# Patient Record
Sex: MALE | Race: WHITE | NOT HISPANIC OR LATINO | Employment: OTHER | URBAN - METROPOLITAN AREA
[De-identification: names, ages, dates, MRNs, and addresses within clinical notes are randomized per-mention and may not be internally consistent; named-entity substitution may affect disease eponyms.]

---

## 2017-02-14 ENCOUNTER — HISTORICAL (OUTPATIENT)
Dept: ADMINISTRATIVE | Facility: HOSPITAL | Age: 37
End: 2017-02-14

## 2017-02-27 ENCOUNTER — HISTORICAL (OUTPATIENT)
Dept: ORTHOPEDICS | Facility: CLINIC | Age: 37
End: 2017-02-27

## 2020-04-07 ENCOUNTER — TELEPHONE (OUTPATIENT)
Dept: FAMILY MEDICINE | Facility: CLINIC | Age: 40
End: 2020-04-07

## 2020-04-07 NOTE — TELEPHONE ENCOUNTER
----- Message from Tevin Palacios sent at 4/7/2020 10:03 AM CDT -----  Contact: self  Type:  Patient Returning Call    Who Called:pt  Who Left Message for Patient:n/a  Does the patient know what this is regarding?:no  Would the patient rather a call back or a response via [facility]? Call back  Best Call Back Number:[phone]  Additional Information: none    Thanks,  Tevin Palacios

## 2020-04-07 NOTE — TELEPHONE ENCOUNTER
I called and spoke with the pt letting him know that I had to cancel his apt on 4/13 because of the COVID-19. I rescheduled him to May 4th

## 2020-04-13 ENCOUNTER — OFFICE VISIT (OUTPATIENT)
Dept: UROLOGY | Facility: CLINIC | Age: 40
End: 2020-04-13
Payer: MEDICAID

## 2020-04-13 VITALS
HEART RATE: 80 BPM | WEIGHT: 240 LBS | HEIGHT: 71 IN | SYSTOLIC BLOOD PRESSURE: 142 MMHG | BODY MASS INDEX: 33.6 KG/M2 | DIASTOLIC BLOOD PRESSURE: 87 MMHG | RESPIRATION RATE: 18 BRPM

## 2020-04-13 DIAGNOSIS — R10.32 LEFT INGUINAL PAIN: ICD-10-CM

## 2020-04-13 DIAGNOSIS — R10.9 LEFT FLANK PAIN: Primary | ICD-10-CM

## 2020-04-13 LAB
BILIRUB UR QL STRIP: NEGATIVE
GLUCOSE UR QL STRIP: NEGATIVE
KETONES UR QL STRIP: NEGATIVE
LEUKOCYTE ESTERASE UR QL STRIP: NEGATIVE
PH, POC UA: 6
POC AMORP, URINE: ABNORMAL
POC BACTI, URINE: ABNORMAL
POC BLOOD, URINE: NEGATIVE
POC CASTS, URINE: ABNORMAL
POC CRYST, URINE: ABNORMAL
POC EPITH, URINE: ABNORMAL
POC HCG, URINE: ABNORMAL
POC HYALIN, URINE: 0 LPF
POC MUCUS, URINE: ABNORMAL
POC NITRATES, URINE: NEGATIVE
POC OTHER, URINE: ABNORMAL
POC RBC, URINE: 0 HPF
POC WBC, URINE: 0 HPF
PROT UR QL STRIP: NEGATIVE
SP GR UR STRIP: 1.02 (ref 1–1.03)
UROBILINOGEN UR STRIP-ACNC: 0.2 (ref 0.3–2.2)

## 2020-04-13 PROCEDURE — 99213 OFFICE O/P EST LOW 20 MIN: CPT | Mod: S$GLB,,, | Performed by: SPECIALIST

## 2020-04-13 PROCEDURE — 99213 PR OFFICE/OUTPT VISIT, EST, LEVL III, 20-29 MIN: ICD-10-PCS | Mod: S$GLB,,, | Performed by: SPECIALIST

## 2020-04-13 RX ORDER — TIOTROPIUM BROMIDE AND OLODATEROL 3.124; 2.736 UG/1; UG/1
SPRAY, METERED RESPIRATORY (INHALATION)
COMMUNITY
Start: 2020-03-20 | End: 2022-08-31 | Stop reason: SDUPTHER

## 2020-04-13 RX ORDER — GABAPENTIN 600 MG/1
TABLET ORAL
COMMUNITY
Start: 2020-03-20 | End: 2020-05-04 | Stop reason: SDUPTHER

## 2020-04-13 RX ORDER — LISINOPRIL 10 MG/1
TABLET ORAL
COMMUNITY
Start: 2020-03-20 | End: 2020-05-04 | Stop reason: SDUPTHER

## 2020-04-13 RX ORDER — OMEPRAZOLE 40 MG/1
40 CAPSULE, DELAYED RELEASE ORAL DAILY
COMMUNITY
Start: 2020-02-19 | End: 2020-05-04 | Stop reason: SDUPTHER

## 2020-04-13 NOTE — PROGRESS NOTES
Subjective:       Patient ID: Hiren Bautista is a 39 y.o. male.    Chief Complaint: Cyst (Kidney cyst/[id number]); Groin Pain (dull pain in upper left leg); and Flank Pain (pain since 2015)      HPI:  39-year-old man my last seen in 2018.  He presented to me with left testicular pain his history was very complex.    He had a pantaloon hernia which was repaired with mesh in [place] he developed hematoma was treated over there for hematoma.  Subsequent to that he started experiencing left testicular pain and discomfort radiating to left lower abdomen.  This was very debilitated and he could not function well.  He had seen different general surgeons.  He saw a general surgeon in [facility] to infuse some local anesthetic into the inguinal area on the left side this did not really help him.  He saw another general surgeon in [facility] who remained recommended mesh removal in a laparoscopic fashion.  But this physician could not perform laparoscopy.  Patient in the interim saw me for left testicular pain.  And I did make a referral to Dr. Gutierrez for further management.    I have not seen patient since 2018 and since then he has been seen history poor where he underwent some further additional surgeries.  He continues to have left inguinal pain.    His presented to me with left flank pain that has been going on for several weeks and progressively worsening.  This pain is aggravated by direct pressure on that side so he cannot lay on that side All.  He feels the pain at all times he says the intensity does not change it is always at about a 6-7.  He lives with his pain.  He has a history of a left renal cyst and he is wondering if this is related to his cyst.  He has not been imaged since 2018.  Patient denies any gross hematuria denies any fevers or chills.    Past Medical History:   Past Medical History:   Diagnosis Date    Colon polyp     Thyroid disease     Urinary tract infection        Past  Surgical Historical:   Past Surgical History:   Procedure Laterality Date    COLON SURGERY      HERNIA REPAIR      NERVE SURGERY          Medications:   Medication List with Changes/Refills   Current Medications    GABAPENTIN (NEURONTIN) 600 MG TABLET        LISINOPRIL 10 MG TABLET        OMEPRAZOLE (PRILOSEC) 40 MG CAPSULE    Take 40 mg by mouth once daily.    STIOLTO RESPIMAT 2.5-2.5 MCG/ACTUATION MIST    INHALE 2 PUFFS BY MOUTH ONCE DAILY AT THE SAME TIME EVERY DAY        Past Social History:   Social History     Socioeconomic History    Marital status: Single     Spouse name: Not on file    Number of children: Not on file    Years of education: Not on file    Highest education level: Not on file   Occupational History    Not on file   Social Needs    Financial resource strain: Not on file    Food insecurity:     Worry: Not on file     Inability: Not on file    Transportation needs:     Medical: Not on file     Non-medical: Not on file   Tobacco Use    Smoking status: Never Smoker    Smokeless tobacco: Never Used   Substance and Sexual Activity    Alcohol use: Never     Frequency: Never    Drug use: Never    Sexual activity: Not on file   Lifestyle    Physical activity:     Days per week: Not on file     Minutes per session: Not on file    Stress: Not on file   Relationships    Social connections:     Talks on phone: Not on file     Gets together: Not on file     Attends [employer] service: Not on file     Active member of club or organization: Not on file     Attends meetings of clubs or organizations: Not on file     Relationship status: Not on file   Other Topics Concern    Not on file   Social History Narrative    Not on file       Allergies: Review of patient's allergies indicates:  No Known Allergies     Family History:   Family History   Problem Relation Age of Onset    Hypertension Father     Diabetes Father     Kidney cancer Mother     Hypertension Mother         Review of  Systems:   systems reviewed and notable for left flank pain  All other systems were reviewed Neg except as stated in the HPI    Physical Exam:  General: A&Ox3. No apparent distress. No deformities.  Neck: No masses. Normal thyroid.  Lungs: normal inspiration. No use of accessory muscles.  Heart: normal pulse. No arrhythmias.  Abdomen: Soft. CVAT. ND. No masses. No hernias. No hepatosplenomegaly.  Lymphatic: Neck and groin nodes negative.  Skin: The skin is warm and dry. No jaundice.  Neurology: Cranial nerves 2-12 crossly intact, no focal weaknesses, no sensation deficits, no motor deficits  Ext: No clubbing, cyanosis or edema.  :  Deferred      Assessment/Plan:       39-year-old man with left flank pain of unknown etiology.    1.  Going to start by imaging patient completely with a CT scan with and without contrast to characterize the nature of the left kidney as well as the left collecting system.  Explained to patient that the source of his pain could be as a result of anything in the retroperitoneum.  I will start by assessing the anatomy with imaging.  In the meantime he will continue use conservative methods to control the pain including heat pads.  2.  Obtain blood for a BMP patient has a history of high blood pressure will need to make sure his renal function is adequate to tolerate intravenous contrast.    Problem List Items Addressed This Visit     None      Visit Diagnoses     Left flank pain    -  Primary    Left inguinal pain

## 2020-04-14 LAB
ANION GAP SERPL CALC-SCNC: 15 MMOL/L (ref 8–17)
BUN/CREAT SERPL: 12.7 (ref 6–20)
CALCIUM SERPL-MCNC: 9.3 MG/DL (ref 8.6–10.2)
CARBON DIOXIDE, CO2: 25 MMOL/L (ref 22–29)
CHLORIDE: 103 MMOL/L (ref 98–107)
CREAT SERPL-MCNC: 1.04 MG/DL (ref 0.7–1.2)
GFR ESTIMATION: 78.7
GLUCOSE: 83 MG/DL (ref 74–106)
POTASSIUM: 4.5 MMOL/L (ref 3.5–5.1)
SODIUM: 143 MMOL/L (ref 136–145)
UREA NITROGEN (BUN): 13.2 MG/DL (ref 6–20)

## 2020-04-23 ENCOUNTER — TELEPHONE (OUTPATIENT)
Dept: UROLOGY | Facility: CLINIC | Age: 40
End: 2020-04-23

## 2020-04-23 NOTE — TELEPHONE ENCOUNTER
Call returned. Pt was informed that d/t COVID, scheduling for CT is on hold and once everything is lifted, pt will receive a call to schedule CT.  Pt verbalized understanding.         ----- Message from Nirali Oakley sent at 4/23/2020  8:37 AM CDT -----  Contact: Patient   Patient called In regards to his CT scan he never heard back from an nurse , please call back at [phone].        Thanks,  Nirali Oakley

## 2020-05-04 ENCOUNTER — OFFICE VISIT (OUTPATIENT)
Dept: FAMILY MEDICINE | Facility: CLINIC | Age: 40
End: 2020-05-04
Payer: MEDICAID

## 2020-05-04 VITALS
WEIGHT: 240.38 LBS | DIASTOLIC BLOOD PRESSURE: 71 MMHG | RESPIRATION RATE: 16 BRPM | SYSTOLIC BLOOD PRESSURE: 121 MMHG | HEIGHT: 71 IN | HEART RATE: 77 BPM | BODY MASS INDEX: 33.65 KG/M2 | TEMPERATURE: 98 F

## 2020-05-04 DIAGNOSIS — E03.9 HYPOTHYROIDISM, UNSPECIFIED TYPE: ICD-10-CM

## 2020-05-04 DIAGNOSIS — K92.0 HEMATEMESIS OF UNKNOWN CAUSE: Chronic | ICD-10-CM

## 2020-05-04 DIAGNOSIS — E55.9 VITAMIN D DEFICIENCY: ICD-10-CM

## 2020-05-04 DIAGNOSIS — Z87.19 HISTORY OF LEFT INGUINAL HERNIA: Chronic | ICD-10-CM

## 2020-05-04 DIAGNOSIS — M25.561 CHRONIC PAIN OF RIGHT KNEE: Chronic | ICD-10-CM

## 2020-05-04 DIAGNOSIS — K21.9 GASTROESOPHAGEAL REFLUX DISEASE, ESOPHAGITIS PRESENCE NOT SPECIFIED: Chronic | ICD-10-CM

## 2020-05-04 DIAGNOSIS — G89.29 CHRONIC PAIN OF RIGHT KNEE: Chronic | ICD-10-CM

## 2020-05-04 DIAGNOSIS — E11.9 TYPE 2 DIABETES MELLITUS WITHOUT COMPLICATION, WITHOUT LONG-TERM CURRENT USE OF INSULIN: ICD-10-CM

## 2020-05-04 DIAGNOSIS — M50.20 HERNIATION OF CERVICAL INTERVERTEBRAL DISC: ICD-10-CM

## 2020-05-04 DIAGNOSIS — E53.8 B12 DEFICIENCY: ICD-10-CM

## 2020-05-04 DIAGNOSIS — G62.9 NEUROPATHY: Chronic | ICD-10-CM

## 2020-05-04 DIAGNOSIS — Z76.89 ENCOUNTER TO ESTABLISH CARE: Primary | ICD-10-CM

## 2020-05-04 DIAGNOSIS — I10 HTN (HYPERTENSION), BENIGN: Chronic | ICD-10-CM

## 2020-05-04 LAB
ABS NRBC COUNT: 0 X 10 3/UL (ref 0–0.01)
ABSOLUTE BASOPHIL: 0.04 X 10 3/UL (ref 0–0.22)
ABSOLUTE EOSINOPHIL: 0.05 X 10 3/UL (ref 0.04–0.54)
ABSOLUTE IMMATURE GRAN: 0.04 X 10 3/UL (ref 0–0.04)
ABSOLUTE LYMPHOCYTE: 1.78 X 10 3/UL (ref 0.86–4.75)
ABSOLUTE MONOCYTE: 0.76 X 10 3/UL (ref 0.22–1.08)
B12: 395 PG/ML (ref 232–1245)
BASOPHILS NFR BLD: 0.4 % (ref 0.2–1.2)
CHOLEST SERPL-MSCNC: 202 MG/DL (ref 100–200)
EOSINOPHIL NFR BLD: 0.5 % (ref 0.7–7)
ESTIMATED AVERAGE GLUCOSE: 99 MG/DL
HBA1C MFR BLD: 5.1 % (ref 4–6)
HCT VFR BLD AUTO: 45.4 % (ref 42–52)
HDLC SERPL-MCNC: 35 MG/DL
HGB BLD-MCNC: 15.9 G/DL (ref 14–18)
IMMATURE GRANULOCYTES: 0.4 % (ref 0–0.5)
LDL/HDL RATIO: 4.2 (ref 1–3)
LDLC SERPL CALC-MCNC: 146.4 MG/DL (ref 0–100)
LYMPHOCYTES NFR BLD: 18.2 % (ref 19.3–53.1)
MCH RBC QN AUTO: 30 PG (ref 27–32)
MCHC RBC AUTO-ENTMCNC: 35 G/DL (ref 32–36)
MCV RBC AUTO: 85.7 FL (ref 80–94)
MONOCYTES NFR BLD: 7.8 % (ref 4.7–12.5)
NEUTROPHILS ABSOLUTE COUNT: 7.09 X 10 3/UL (ref 2.15–7.56)
NEUTROPHILS NFR BLD: 72.7 % (ref 34–71.1)
NUCLEATED RED BLOOD CELLS: 0 /100 WBC (ref 0–0.2)
PLATELET # BLD AUTO: 247 X 10 3/UL (ref 135–400)
RBC # BLD AUTO: 5.3 X 10 6/UL (ref 4.7–6.1)
RDW-SD: 37 FL (ref 37–54)
TRIGL SERPL-MCNC: 103 MG/DL (ref 0–150)
TSH SERPL DL<=0.005 MIU/L-ACNC: 0.55 UIU/ML (ref 0.27–4.2)
VITAMIN D (25OHD): 25 NG/ML
WBC # BLD: 9.76 X 10 3/UL (ref 4.3–10.8)

## 2020-05-04 PROCEDURE — 99204 OFFICE O/P NEW MOD 45 MIN: CPT | Mod: S$GLB,,, | Performed by: FAMILY MEDICINE

## 2020-05-04 PROCEDURE — 99204 PR OFFICE/OUTPT VISIT, NEW, LEVL IV, 45-59 MIN: ICD-10-PCS | Mod: S$GLB,,, | Performed by: FAMILY MEDICINE

## 2020-05-04 RX ORDER — OMEPRAZOLE 40 MG/1
40 CAPSULE, DELAYED RELEASE ORAL EVERY MORNING
Qty: 30 CAPSULE | Refills: 11 | Status: SHIPPED | OUTPATIENT
Start: 2020-05-04 | End: 2021-06-07 | Stop reason: SDUPTHER

## 2020-05-04 RX ORDER — GABAPENTIN 600 MG/1
600 TABLET ORAL 2 TIMES DAILY
Qty: 60 TABLET | Refills: 11 | Status: SHIPPED | OUTPATIENT
Start: 2020-05-04 | End: 2021-02-01 | Stop reason: SDUPTHER

## 2020-05-04 RX ORDER — LISINOPRIL 10 MG/1
10 TABLET ORAL DAILY
Qty: 30 TABLET | Refills: 11 | Status: SHIPPED | OUTPATIENT
Start: 2020-05-04 | End: 2021-06-07 | Stop reason: SDUPTHER

## 2020-05-04 NOTE — PROGRESS NOTES
Subjective:       Patient ID: Hiren Bautista is a 39 y.o. male.    Chief Complaint: Establish Care (pt is here to get estableished with a new PCP. pt is needing medication refills. )    38 yo M here to establish care. previously seen by Phoenix Medel at [place].   Pt is seeing Urologist (Dr Santiago) and general surgery  Pt had a mesh put in for hernia in 2015 - pt has paper work.  Pt also has a thyroid nodule for which he sees ENT at [place].  HTN: very well controlled with lisinopril 10 mg - refilled for a yearl  Neuropathy: pt had the left ileoinguinal nerve partly taken out for pain - which started after his mesh. Gabapentin refilled.   Pt also has some neck disk herniation.     Review of Systems   Constitutional: Negative for chills, fatigue and fever.   HENT: Negative for congestion, drooling, sneezing and sore throat.    Eyes: Negative for pain and visual disturbance.   Respiratory: Negative for cough and shortness of breath.    Cardiovascular: Negative for chest pain.   Gastrointestinal: Negative for abdominal pain, constipation, diarrhea and nausea.   Endocrine: Negative for cold intolerance and heat intolerance.   Genitourinary: Negative for difficulty urinating and frequency.   Musculoskeletal: Negative for myalgias.   Allergic/Immunologic: Negative for food allergies.   Neurological: Negative for seizures and headaches.   Psychiatric/Behavioral: Negative for behavioral problems.       Objective:      Physical Exam   Constitutional: He appears well-developed.   HENT:   Right Ear: External ear normal.   Left Ear: External ear normal.   Mouth/Throat: Oropharynx is clear and moist.   Eyes: Conjunctivae and EOM are normal.   Neck: Normal range of motion.   Cardiovascular: Normal rate, regular rhythm and intact distal pulses.   Pulmonary/Chest: Effort normal and breath sounds normal.   Abdominal: Soft.   Musculoskeletal: Normal range of motion.   Neurological: He is alert.   Skin: Skin is warm. Capillary  refill takes less than 2 seconds.   Psychiatric: He has a normal mood and affect.   Nursing note and vitals reviewed.      Assessment:       1. Encounter to establish care    2. History of left inguinal hernia - Dr Shahid ([place])    3. Hematemesis of unknown cause - Dr Grimes ([place])    4. Neuropathy - ileoinguinal nerve excision - Dr Grimes ([place]) Stable   5. Chronic pain of right knee - walking with stick    6. HTN (hypertension), benign    7. Gastroesophageal reflux disease, esophagitis presence not specified    8. Hypothyroidism, unspecified type    9. Vitamin D deficiency    10. Type 2 diabetes mellitus without complication, without long-term current use of insulin    11. B12 deficiency    12. Herniation of cervical intervertebral disc        Plan:       PROBLEM LIST     Hiren was seen today for establish care.    Diagnoses and all orders for this visit:    Encounter to establish care    History of left inguinal hernia - Dr Shahid ([place])  Comments:  repaired 2015    Hematemesis of unknown cause - Dr Grimes ([place])  Comments:  Stiolgo respimat    Neuropathy - ileoinguinal nerve excision - Dr Grimes ([place])  Comments:  gabapentin refilled  Orders:  -     gabapentin (NEURONTIN) 600 MG tablet; Take 1 tablet (600 mg total) by mouth 2 (two) times daily.    Chronic pain of right knee - walking with stick  Comments:  partial ACL tear    HTN (hypertension), benign  Comments:  lisinopril refilled  Orders:  -     lisinopriL 10 MG tablet; Take 1 tablet (10 mg total) by mouth once daily.  -     CBC auto differential; Future  -     Lipid Panel; Future  -     CBC auto differential  -     Lipid Panel    Gastroesophageal reflux disease, esophagitis presence not specified  Comments:  omeprazole refilled  Orders:  -     omeprazole (PRILOSEC) 40 MG capsule; Take 1 capsule (40 mg total) by mouth every morning.    Hypothyroidism, unspecified type  -     TSH; Future  -     TSH    Vitamin D  deficiency  -     Vitamin D; Future  -     Vitamin D    Type 2 diabetes mellitus without complication, without long-term current use of insulin  -     Hemoglobin A1C; Future  -     Hemoglobin A1C    B12 deficiency  -     Vitamin B12; Future  -     Vitamin B12    Herniation of cervical intervertebral disc

## 2020-07-30 ENCOUNTER — OFFICE VISIT (OUTPATIENT)
Dept: UROLOGY | Facility: CLINIC | Age: 40
End: 2020-07-30
Payer: MEDICAID

## 2020-07-30 ENCOUNTER — OFFICE VISIT (OUTPATIENT)
Dept: FAMILY MEDICINE | Facility: CLINIC | Age: 40
End: 2020-07-30
Payer: MEDICAID

## 2020-07-30 VITALS
DIASTOLIC BLOOD PRESSURE: 83 MMHG | HEIGHT: 71 IN | SYSTOLIC BLOOD PRESSURE: 122 MMHG | HEART RATE: 76 BPM | TEMPERATURE: 97 F | BODY MASS INDEX: 34.97 KG/M2 | WEIGHT: 249.81 LBS

## 2020-07-30 VITALS
SYSTOLIC BLOOD PRESSURE: 121 MMHG | HEIGHT: 71 IN | WEIGHT: 249 LBS | BODY MASS INDEX: 34.86 KG/M2 | HEART RATE: 74 BPM | RESPIRATION RATE: 17 BRPM | DIASTOLIC BLOOD PRESSURE: 75 MMHG

## 2020-07-30 DIAGNOSIS — I10 HTN (HYPERTENSION), BENIGN: Chronic | ICD-10-CM

## 2020-07-30 DIAGNOSIS — R10.9 LEFT FLANK PAIN: Primary | ICD-10-CM

## 2020-07-30 DIAGNOSIS — M25.561 CHRONIC PAIN OF RIGHT KNEE: Chronic | ICD-10-CM

## 2020-07-30 DIAGNOSIS — S83.512S RUPTURE OF ANTERIOR CRUCIATE LIGAMENT OF LEFT KNEE, SEQUELA: Chronic | ICD-10-CM

## 2020-07-30 DIAGNOSIS — R10.32 LEFT INGUINAL PAIN: ICD-10-CM

## 2020-07-30 DIAGNOSIS — G62.9 NEUROPATHY: Chronic | ICD-10-CM

## 2020-07-30 DIAGNOSIS — M50.20 HERNIATION OF CERVICAL INTERVERTEBRAL DISC: Chronic | ICD-10-CM

## 2020-07-30 DIAGNOSIS — Z86.010 HISTORY OF COLON POLYPS: Primary | Chronic | ICD-10-CM

## 2020-07-30 DIAGNOSIS — G89.29 CHRONIC PAIN OF RIGHT KNEE: Chronic | ICD-10-CM

## 2020-07-30 PROBLEM — S83.512A LEFT ANTERIOR CRUCIATE LIGAMENT TEAR: Status: ACTIVE | Noted: 2020-07-30

## 2020-07-30 PROCEDURE — 99214 OFFICE O/P EST MOD 30 MIN: CPT | Mod: S$GLB,,, | Performed by: FAMILY MEDICINE

## 2020-07-30 PROCEDURE — 99213 PR OFFICE/OUTPT VISIT, EST, LEVL III, 20-29 MIN: ICD-10-PCS | Mod: S$GLB,,, | Performed by: SPECIALIST

## 2020-07-30 PROCEDURE — 99213 OFFICE O/P EST LOW 20 MIN: CPT | Mod: S$GLB,,, | Performed by: SPECIALIST

## 2020-07-30 PROCEDURE — 99214 PR OFFICE/OUTPT VISIT, EST, LEVL IV, 30-39 MIN: ICD-10-PCS | Mod: S$GLB,,, | Performed by: FAMILY MEDICINE

## 2020-07-30 NOTE — PROGRESS NOTES
Subjective:       Patient ID: Hiren Bautista is a 39 y.o. male.    Chief Complaint: Flank Pain (Left side tender)      HPI:  39-year-old man who is here today for follow-up.  His last visit with us was April 2020.  He presented to me back in 2018 for left testicular pain.  He has a very complex history please see below.    He had a pantaloon hernia which was repaired with mesh in [place] he developed hematoma was treated over there for hematoma.  Subsequent to that he started experiencing left testicular pain and discomfort radiating to left lower abdomen.  This was very debilitated and he could not function well.  He had seen different general surgeons.  He saw a general surgeon in [facility] to infuse some local anesthetic into the inguinal area on the left side this did not really help him.  He saw another general surgeon in [facility] who remained recommended mesh removal in a laparoscopic fashion.  But this physician could not perform laparoscopy.  Patient in the interim saw me for left testicular pain.  And I did make a referral to Dr. Gutierrez for further management    Patient is since then underwent additional surgeries in [place] for removal of mesh product.  He has been dealing with left flank pain which he thinks is related to his left kidney.  He reports that he cannot lay on the left side all.  When we saw him in April we checked his renal function was adequate we ordered a CT scan but it was never done because of the COVID 19 pandemic.  He continues experience the same pain.  He has a history of a left renal cyst and is wondering if this pain is related to that cyst.    Past Medical History:   Past Medical History:   Diagnosis Date    Colon polyp     Thyroid disease     Urinary tract infection        Past Surgical Historical:   Past Surgical History:   Procedure Laterality Date    COLON SURGERY      HERNIA REPAIR      LUNG SURGERY      washed the lungs out because coughing up  blood.    NERVE SURGERY          Medications:   Medication List with Changes/Refills   Current Medications    GABAPENTIN (NEURONTIN) 600 MG TABLET    Take 1 tablet (600 mg total) by mouth 2 (two) times daily.    LISINOPRIL 10 MG TABLET    Take 1 tablet (10 mg total) by mouth once daily.    OMEPRAZOLE (PRILOSEC) 40 MG CAPSULE    Take 1 capsule (40 mg total) by mouth every morning.    STIOLTO RESPIMAT 2.5-2.5 MCG/ACTUATION MIST    INHALE 2 PUFFS BY MOUTH ONCE DAILY AT THE SAME TIME EVERY DAY        Past Social History:   Social History     Socioeconomic History    Marital status: Single     Spouse name: Not on file    Number of children: Not on file    Years of education: Not on file    Highest education level: Not on file   Occupational History    Not on file   Social Needs    Financial resource strain: Not on file    Food insecurity     Worry: Not on file     Inability: Not on file    Transportation needs     Medical: Not on file     Non-medical: Not on file   Tobacco Use    Smoking status: Never Smoker    Smokeless tobacco: Never Used   Substance and Sexual Activity    Alcohol use: Never     Frequency: Never    Drug use: Never    Sexual activity: Not on file   Lifestyle    Physical activity     Days per week: Not on file     Minutes per session: Not on file    Stress: Not on file   Relationships    Social connections     Talks on phone: Not on file     Gets together: Not on file     Attends Yazdanism service: Not on file     Active member of club or organization: Not on file     Attends meetings of clubs or organizations: Not on file     Relationship status: Not on file   Other Topics Concern    Not on file   Social History Narrative    Not on file       Allergies: Review of patient's allergies indicates:  No Known Allergies     Family History:   Family History   Problem Relation Age of Onset    Hypertension Father     Diabetes Father     Kidney cancer Mother     Hypertension Mother          Review of Systems:   systems reviewed and notable for left flank pain  All other systems were reviewed Neg except as stated in the HPI    Physical Exam:  General: A&Ox3. No apparent distress. No deformities.  Neck: No masses. Normal thyroid.  Lungs: normal inspiration. No use of accessory muscles.  Heart: normal pulse. No arrhythmias.  Abdomen: Soft. NT. ND. No masses. No hernias. No hepatosplenomegaly.  Lymphatic: Neck and groin nodes negative.  Skin: The skin is warm and dry. No jaundice.  Neurology: Cranial nerves 2-12 crossly intact, no focal weaknesses, no sensation deficits, no motor deficits  Ext: No clubbing, cyanosis or edema.  :  Deferred      Assessment/Plan:       39-year-old man with persistent left flank pain.    1.  Order a CT scan abdomen and pelvis with and without IV contrast to characterize his left collecting system as well as left kidney.  He has a history of left renal cyst.  2.  Make a 6 month follow-up appointment but we will make sure we inform him of the CT scan results as soon as they become available to us.    Problem List Items Addressed This Visit     None      Visit Diagnoses     Left flank pain    -  Primary    Relevant Orders    CT Abdomen Pelvis W Wo Contrast    Left inguinal pain        Relevant Orders    CT Abdomen Pelvis W Wo Contrast

## 2020-07-30 NOTE — PATIENT INSTRUCTIONS
We will refer to orthopedic and surgery- if orthopedic cannot see you we might have to send you to [facility]

## 2020-07-30 NOTE — PROGRESS NOTES
Subjective:       Patient ID: Hiren Bautista is a 39 y.o. male.    Chief Complaint: Follow-up (pt states that he is here for numbness in his left arm feels like ants biting him, he thinks there is a pinched nurve in his neck. States that his whole left side down his leg hurts. )    40 yo M here for a couple of items.  Pt has numbness and tingling on his left arm. This started in November and it is worsening. Pt is walking with a cane for years for a torn ACL left knee. Pt has an MRI in the medica section in his file. He said that he had plenty of physical therapy and nothing worked.   He has a torn ACL and his knee priscilla ever so often and he would like to see an orthopedic surgeon for it. MRI from 2016 shows a partially torn left sided ACL.  Pt has thyroid issues  Blood in stool: blood is not in stool but it is on the paper when wiping. Pt has done upper and lower scopes and he had found polyps - he had more than three. Pt does not think he has hemorrhoids. The scenario is the same as when he was told he had polyps.   Neuropathy: pt on gabapentin. No AEs,   HTN: controlled today, no AEs, pt is compliant.     Review of Systems   Constitutional: Negative for chills, fatigue and fever.   HENT: Negative for congestion, drooling, sneezing and sore throat.    Eyes: Negative for pain and visual disturbance.   Respiratory: Negative for cough and shortness of breath.    Cardiovascular: Negative for chest pain.   Gastrointestinal: Negative for abdominal pain, constipation, diarrhea and nausea.   Endocrine: Negative for cold intolerance and heat intolerance.   Genitourinary: Negative for difficulty urinating and frequency.   Musculoskeletal: Negative for myalgias.   Allergic/Immunologic: Negative for food allergies.   Neurological: Negative for seizures and headaches.   Psychiatric/Behavioral: Negative for behavioral problems.       Objective:      Physical Exam  Vitals signs and nursing note reviewed.   Constitutional:        Appearance: Normal appearance. He is well-developed.   HENT:      Head: Normocephalic and atraumatic.      Right Ear: External ear normal.      Left Ear: External ear normal.      Nose: Nose normal.   Eyes:      Extraocular Movements: Extraocular movements intact.      Conjunctiva/sclera: Conjunctivae normal.   Neck:      Musculoskeletal: Normal range of motion and neck supple. No neck rigidity.   Cardiovascular:      Rate and Rhythm: Normal rate and regular rhythm.      Pulses: Normal pulses.   Pulmonary:      Effort: Pulmonary effort is normal. No respiratory distress.      Breath sounds: Normal breath sounds. No wheezing.   Abdominal:      Palpations: Abdomen is soft.      Tenderness: There is no abdominal tenderness. There is no guarding.   Musculoskeletal: Normal range of motion.   Lymphadenopathy:      Cervical: No cervical adenopathy.   Skin:     General: Skin is warm.      Capillary Refill: Capillary refill takes less than 2 seconds.      Coloration: Skin is not jaundiced or pale.   Neurological:      General: No focal deficit present.      Mental Status: He is alert. Mental status is at baseline.   Psychiatric:         Mood and Affect: Mood normal.         Behavior: Behavior normal.         Assessment:       1. History of colon polyps    2. Herniation of cervical intervertebral disc    3. Rupture of anterior cruciate ligament of left knee, sequela    4. Chronic pain of right knee - walking with stick    5. HTN (hypertension), benign Well controlled   6. Neuropathy - ileoinguinal nerve excision - Dr Grimes ([place]) Stable       Plan:       PROBLEM LIST     Hiren was seen today for follow-up.    Diagnoses and all orders for this visit:    History of colon polyps  Comments:  referral to colonoscopy  Orders:  -     Ambulatory referral/consult to General Surgery; Future    Herniation of cervical intervertebral disc  Comments:  will consider neurosurgey in futur    Rupture of anterior cruciate ligament  of left knee, sequela  Comments:  worsening - referral to Dr Lazar  Orders:  -     Ambulatory referral/consult to Orthopedics; Future    Chronic pain of right knee - walking with stick    HTN (hypertension), benign  Comments:  continue meds as before    Neuropathy - ileoinguinal nerve excision - Dr Grimes ([place])  Comments:  continue gabapentin

## 2020-08-13 ENCOUNTER — DOCUMENTATION ONLY (OUTPATIENT)
Dept: UROLOGY | Facility: CLINIC | Age: 40
End: 2020-08-13

## 2020-08-13 NOTE — PROGRESS NOTES
Received information from the scheduling service the patient has been called multiple times to scheduling for CT scan imaging without any response will call back.  This a dictation is to document that patient was not meeting his obligation to do the imaging

## 2020-08-25 ENCOUNTER — OFFICE VISIT (OUTPATIENT)
Dept: SURGERY | Facility: CLINIC | Age: 40
End: 2020-08-25
Payer: MEDICAID

## 2020-08-25 VITALS
DIASTOLIC BLOOD PRESSURE: 84 MMHG | OXYGEN SATURATION: 98 % | RESPIRATION RATE: 16 BRPM | WEIGHT: 250 LBS | BODY MASS INDEX: 34.87 KG/M2 | HEART RATE: 80 BPM | SYSTOLIC BLOOD PRESSURE: 128 MMHG

## 2020-08-25 DIAGNOSIS — Z86.010 HISTORY OF COLON POLYPS: Chronic | ICD-10-CM

## 2020-08-25 PROCEDURE — 99204 OFFICE O/P NEW MOD 45 MIN: CPT | Mod: S$GLB,,, | Performed by: SURGERY

## 2020-08-25 PROCEDURE — 99204 PR OFFICE/OUTPT VISIT, NEW, LEVL IV, 45-59 MIN: ICD-10-PCS | Mod: S$GLB,,, | Performed by: SURGERY

## 2020-08-25 NOTE — LETTER
August 25, 2020      Chrissy Walker MD  [address]  [address]  [place] [address]           [facility]  [address]  [place] [address]  Phone: [phone]  Fax: [phone]          Patient: Hiren Bautista   MR Number: [id number]   YOB: 1980   Date of Visit: 8/25/2020       Dear Dr. Chrissy Walker:    Thank you for referring Hiren Bautista to me for evaluation. Attached you will find relevant portions of my assessment and plan of care.    If you have questions, please do not hesitate to call me. I look forward to following Hiren Bautista along with you.    Sincerely,    [place] Ro[place], DO    Enclosure  CC:  No Recipients    If you would like to receive this communication electronically, please contact [email or web address] or [phone] to request more information on [employer] Link access.    For providers and/or their staff who would like to refer a patient to [facility], please contact us through our one-stop-shop provider referral line, [facility] , at [phone].    If you feel you have received this communication in error or would no longer like to receive these types of communications, please e-mail [email or web address]

## 2020-08-25 NOTE — PROGRESS NOTES
Subjective:       Patient ID: Hiren Bautista is a 39 y.o. male.    Chief Complaint: Colon Cancer Screening    39-year-old male with a history of a colonoscopy and polyps last year presents with repeat bleeding per rectum, not having any abdominal pain, bleeding quite frequently.    Review of Systems   Constitutional: Negative for chills, fatigue and fever.   HENT: Negative for nasal congestion and rhinorrhea.    Respiratory: Negative for shortness of breath and wheezing.    Cardiovascular: Negative for chest pain and palpitations.   Gastrointestinal: Negative for abdominal pain, blood in stool, diarrhea, nausea and vomiting.   Endocrine: Negative for cold intolerance and heat intolerance.   Genitourinary: Negative for difficulty urinating.   Musculoskeletal: Negative for joint swelling and myalgias.   Integumentary:  Negative for rash and wound.   Neurological: Negative for weakness, light-headedness and numbness.   Psychiatric/Behavioral: Negative for agitation and confusion.         Objective:      Physical Exam  Constitutional:       Appearance: Normal appearance. He is well-developed.   HENT:      Head: Normocephalic and atraumatic.      Nose: Nose normal.   Eyes:      General: Lids are normal.      Conjunctiva/sclera: Conjunctivae normal.   Neck:      Musculoskeletal: Normal range of motion.      Trachea: Trachea normal.   Cardiovascular:      Rate and Rhythm: Normal rate and regular rhythm.   Pulmonary:      Effort: Pulmonary effort is normal.      Breath sounds: Normal breath sounds.   Abdominal:      General: Bowel sounds are normal. There is no distension.      Palpations: Abdomen is soft.      Tenderness: There is no abdominal tenderness.      Hernia: No hernia is present.   Skin:     General: Skin is warm and dry.   Neurological:      Mental Status: He is alert and oriented to person, place, and time.   Psychiatric:         Speech: Speech normal.         Behavior: Behavior normal. Behavior is  cooperative.         Assessment:       1. History of colon polyps        Plan:       39-year-old male with melena and history of polyps, patient will be scheduled for diagnostic colonoscopy.

## 2020-09-09 ENCOUNTER — TELEPHONE (OUTPATIENT)
Dept: SURGERY | Facility: CLINIC | Age: 40
End: 2020-09-09

## 2020-09-10 ENCOUNTER — TELEPHONE (OUTPATIENT)
Dept: FAMILY MEDICINE | Facility: CLINIC | Age: 40
End: 2020-09-10

## 2020-09-10 NOTE — TELEPHONE ENCOUNTER
----- Message from Corrina Patterson sent at 9/9/2020  1:12 PM CDT -----  Contact: self  Pt have a few inquiries in to discuss with staff please call pt back at [phone]

## 2020-09-14 ENCOUNTER — TELEPHONE (OUTPATIENT)
Dept: FAMILY MEDICINE | Facility: CLINIC | Age: 40
End: 2020-09-14

## 2020-09-14 NOTE — TELEPHONE ENCOUNTER
----- Message from Tanya Montgomery sent at 9/10/2020 11:44 AM CDT -----  Regarding: pt  Type:  Patient Returning Call    Who Called:pt  Who Left Message for Patient:Nurse  Does the patient know what this is regarding?:  Would the patient rather a call back or a response via [facility]? Call back  Best Call Back Number:[phone]  Additional Information:

## 2020-09-15 ENCOUNTER — TELEPHONE (OUTPATIENT)
Dept: FAMILY MEDICINE | Facility: CLINIC | Age: 40
End: 2020-09-15

## 2020-09-15 NOTE — TELEPHONE ENCOUNTER
----- Message from Yuridia Marie sent at 9/14/2020  3:30 PM CDT -----  Type:  Patient Returning Call    Who Called:pt   Who Left Message for Patient:na  Does the patient know what this is regarding?:na  Would the patient rather a call back or a response via [facility]? Callback   Best Call Back Number:[phone]   Additional Information:

## 2020-09-17 ENCOUNTER — TELEPHONE (OUTPATIENT)
Dept: ORTHOPEDICS | Facility: CLINIC | Age: 40
End: 2020-09-17

## 2020-09-17 NOTE — TELEPHONE ENCOUNTER
----- Message from Michael Shepherd sent at 9/17/2020  8:54 AM CDT -----  Regarding: Question about his knee  Hiren doesn't want to believe that Dr. Lazar won't fix his ACL tear and would like to speak with someone in the clinic about this. Please call him at [phone] (home).

## 2020-09-21 ENCOUNTER — TELEPHONE (OUTPATIENT)
Dept: ORTHOPEDICS | Facility: CLINIC | Age: 40
End: 2020-09-21

## 2020-09-21 NOTE — TELEPHONE ENCOUNTER
9/21/20  11:50am  Spoke w/ patient.    He wants to know if referral was sent for shoulder.    I informed him that I did not have a referral for his shoulder, only for his knee.  Advised he call Dr. Walker's office to send referral for shoulder.

## 2020-09-21 NOTE — TELEPHONE ENCOUNTER
----- Message from Edelmira Genao sent at 9/21/2020 11:06 AM CDT -----  Regarding: Referral  Patient need to speak to nurse regarding referral. Call back number [phone]. Tks

## 2020-10-13 ENCOUNTER — TELEPHONE (OUTPATIENT)
Dept: SURGERY | Facility: CLINIC | Age: 40
End: 2020-10-13

## 2020-11-06 ENCOUNTER — OUTSIDE PLACE OF SERVICE (OUTPATIENT)
Dept: SURGERY | Facility: CLINIC | Age: 40
End: 2020-11-06
Payer: MEDICAID

## 2020-11-06 LAB
CALCIUM BLD-MCNC: NEGATIVE MG/DL
COVID-19 AB, IGM: NEGATIVE
PATIENT EMPLOYED IN HEALTHCARE: NORMAL
PATIENT HAS SYMPTOMS FOR CONDITION OF INTEREST: NORMAL
PATIENT HOSPITALIZED BC COND: NORMAL
PATIENT IN CONGREGATE CARE: NORMAL

## 2020-11-06 PROCEDURE — 45378 DIAGNOSTIC COLONOSCOPY: CPT | Mod: PT,,, | Performed by: SURGERY

## 2020-11-06 PROCEDURE — 45378 PR COLONOSCOPY,DIAGNOSTIC: ICD-10-PCS | Mod: PT,,, | Performed by: SURGERY

## 2020-12-16 ENCOUNTER — OFFICE VISIT (OUTPATIENT)
Dept: PRIMARY CARE CLINIC | Facility: CLINIC | Age: 40
End: 2020-12-16
Payer: MEDICAID

## 2020-12-16 VITALS
SYSTOLIC BLOOD PRESSURE: 126 MMHG | HEART RATE: 76 BPM | RESPIRATION RATE: 20 BRPM | HEIGHT: 71 IN | WEIGHT: 254 LBS | BODY MASS INDEX: 35.56 KG/M2 | DIASTOLIC BLOOD PRESSURE: 82 MMHG

## 2020-12-16 DIAGNOSIS — G89.29 CHRONIC PAIN OF RIGHT KNEE: Primary | ICD-10-CM

## 2020-12-16 DIAGNOSIS — Z23 FLU VACCINE NEED: Primary | ICD-10-CM

## 2020-12-16 DIAGNOSIS — M25.561 CHRONIC PAIN OF RIGHT KNEE: Primary | ICD-10-CM

## 2020-12-16 DIAGNOSIS — G89.29 CHRONIC LEFT SHOULDER PAIN: ICD-10-CM

## 2020-12-16 DIAGNOSIS — H53.8 BLURRY VISION: ICD-10-CM

## 2020-12-16 DIAGNOSIS — M25.512 CHRONIC LEFT SHOULDER PAIN: ICD-10-CM

## 2020-12-16 PROCEDURE — 90471 IMMUNIZATION ADMIN: CPT | Mod: S$GLB,,, | Performed by: INTERNAL MEDICINE

## 2020-12-16 PROCEDURE — 90686 IIV4 VACC NO PRSV 0.5 ML IM: CPT | Mod: S$GLB,,, | Performed by: INTERNAL MEDICINE

## 2020-12-16 PROCEDURE — 99213 PR OFFICE/OUTPT VISIT, EST, LEVL III, 20-29 MIN: ICD-10-PCS | Mod: 25,S$GLB,, | Performed by: INTERNAL MEDICINE

## 2020-12-16 PROCEDURE — 99213 OFFICE O/P EST LOW 20 MIN: CPT | Mod: 25,S$GLB,, | Performed by: INTERNAL MEDICINE

## 2020-12-16 PROCEDURE — 90471 PR IMMUNIZ ADMIN,1 SINGLE/COMB VAC/TOXOID: ICD-10-PCS | Mod: S$GLB,,, | Performed by: INTERNAL MEDICINE

## 2020-12-16 PROCEDURE — 90686 PR FLU VACCINE, QIIV4, NO PRSV, 0.5 ML, IM: ICD-10-PCS | Mod: S$GLB,,, | Performed by: INTERNAL MEDICINE

## 2020-12-16 RX ORDER — NAPROXEN 500 MG/1
500 TABLET ORAL 2 TIMES DAILY PRN
COMMUNITY
Start: 2020-11-23 | End: 2022-10-17 | Stop reason: ALTCHOICE

## 2020-12-16 RX ORDER — FAMOTIDINE 20 MG/1
TABLET, FILM COATED ORAL
COMMUNITY
Start: 2020-11-23 | End: 2022-01-13 | Stop reason: ALTCHOICE

## 2020-12-16 NOTE — PROGRESS NOTES
Subjective:      Patient ID: Hiren Bautista is a 40 y.o. male.    Chief Complaint: Establish Care and Referral (Ortho, torn rotator cuff and knee)    HPI     Patient here for follow up and states he has blurry vision, has not been to an ophthalmologist/optometrist, states he was told he is borderline diabetic  Admitted previously in [place] for presumed MI and states had a complete workup done and pain may have been coming from his shoulder   States he had a work injury on his left shoulder and underwent months of therapy but it did not help. Also has an ACL tear which apparently has not been repaired yet.         Review of Systems   Constitutional: Negative for chills and fever.   HENT: Negative for congestion.    Eyes: Positive for blurred vision and redness. Negative for double vision and pain.   Respiratory: Negative for cough, shortness of breath and wheezing.    Gastrointestinal: Negative for abdominal pain, constipation, diarrhea, nausea and vomiting.   Genitourinary: Negative for dysuria, frequency and urgency.   Musculoskeletal: Positive for joint pain.   Skin: Negative for rash.   Neurological: Negative for dizziness and headaches.     Objective:     Physical Exam  Constitutional:       Appearance: Normal appearance. He is obese.   Eyes:      Extraocular Movements: Extraocular movements intact.      Conjunctiva/sclera: Conjunctivae normal.      Pupils: Pupils are equal, round, and reactive to light.   Neck:      Musculoskeletal: Normal range of motion.   Cardiovascular:      Rate and Rhythm: Normal rate and regular rhythm.      Pulses: Normal pulses.   Pulmonary:      Effort: Pulmonary effort is normal.      Breath sounds: Normal breath sounds.   Abdominal:      General: Bowel sounds are normal.      Palpations: Abdomen is soft.   Musculoskeletal:      Right lower leg: No edema.      Left lower leg: No edema.      Comments: Using cane to keep weight off  leg due to injury   Skin:     General: Skin is  warm.      Capillary Refill: Capillary refill takes less than 2 seconds.   Neurological:      General: No focal deficit present.      Mental Status: He is alert.       Assessment:       ICD-10-CM ICD-9-CM   1. Blurry vision  H53.8 368.8   2. Chronic left shoulder pain  M25.512 719.41    G89.29 338.29   3. Chronic pain of right knee - walking with stick  M25.561 719.46    G89.29 338.29   4. Rupture of anterior cruciate ligament of left knee, sequela  S83.512S 905.7       Plan:     Medication List with Changes/Refills   Current Medications    FAMOTIDINE (PEPCID) 20 MG TABLET    TAKE 1 TABLET BY MOUTH EVERY 12 HOURS FOR 10 DAYS    GABAPENTIN (NEURONTIN) 600 MG TABLET    Take 1 tablet (600 mg total) by mouth 2 (two) times daily.    LISINOPRIL 10 MG TABLET    Take 1 tablet (10 mg total) by mouth once daily.    NAPROXEN (NAPROSYN) 500 MG TABLET    Take 500 mg by mouth 2 (two) times daily as needed.    OMEPRAZOLE (PRILOSEC) 40 MG CAPSULE    Take 1 capsule (40 mg total) by mouth every morning.    STIOLTO RESPIMAT 2.5-2.5 MCG/ACTUATION MIST    INHALE 2 PUFFS BY MOUTH ONCE DAILY AT THE SAME TIME EVERY DAY        Blurry vision  -     Ambulatory referral/consult to Ophthalmology; Future; Expected date: 12/23/2020    Chronic left shoulder pain  -     Ambulatory referral/consult to Orthopedics; Future; Expected date: 12/23/2020    Chronic pain of right knee - walking with stick    Rupture of anterior cruciate ligament of left knee, sequela       He has mild erythema of his eyes and he reports watering which I do not think is because of DM, he had an A1c earlier this year and it was normal and he apparently had a complete workup at [facility] and was not told he is diabetic. Blood glucose in clinic was 120. I gave him names of some otc eye drops

## 2020-12-17 PROBLEM — M25.512 CHRONIC LEFT SHOULDER PAIN: Status: ACTIVE | Noted: 2020-12-17

## 2020-12-17 PROBLEM — H53.8 BLURRY VISION: Status: ACTIVE | Noted: 2020-12-17

## 2020-12-17 PROBLEM — G89.29 CHRONIC LEFT SHOULDER PAIN: Status: ACTIVE | Noted: 2020-12-17

## 2020-12-24 ENCOUNTER — TELEPHONE (OUTPATIENT)
Dept: PRIMARY CARE CLINIC | Facility: CLINIC | Age: 40
End: 2020-12-24

## 2020-12-30 ENCOUNTER — TELEPHONE (OUTPATIENT)
Dept: ORTHOPEDICS | Facility: CLINIC | Age: 40
End: 2020-12-30

## 2020-12-30 NOTE — TELEPHONE ENCOUNTER
12/30/20  10:45am  Spoke w/ patient.    We had received referral in beginning of December.  We informed PCP the referral was denied because Dr. Lazar does not care for ACL injuries.      He is to contact his PCP to be referred to another doctor.

## 2020-12-30 NOTE — TELEPHONE ENCOUNTER
----- Message from Edelmira Genao sent at 12/30/2020  9:57 AM CST -----  Contact: patient  Patient returning call to nurse. Call back number [phone]. Marcelinos

## 2021-01-05 ENCOUNTER — TELEPHONE (OUTPATIENT)
Dept: PRIMARY CARE CLINIC | Facility: CLINIC | Age: 41
End: 2021-01-05

## 2021-02-01 ENCOUNTER — OFFICE VISIT (OUTPATIENT)
Dept: UROLOGY | Facility: CLINIC | Age: 41
End: 2021-02-01
Payer: MEDICAID

## 2021-02-01 VITALS
HEIGHT: 71 IN | HEART RATE: 84 BPM | BODY MASS INDEX: 36.26 KG/M2 | WEIGHT: 259 LBS | SYSTOLIC BLOOD PRESSURE: 128 MMHG | DIASTOLIC BLOOD PRESSURE: 67 MMHG

## 2021-02-01 DIAGNOSIS — R10.32 LEFT INGUINAL PAIN: ICD-10-CM

## 2021-02-01 DIAGNOSIS — R10.9 LEFT FLANK PAIN: Primary | ICD-10-CM

## 2021-02-01 DIAGNOSIS — N28.1 ACQUIRED RENAL CYST: ICD-10-CM

## 2021-02-01 DIAGNOSIS — G62.9 NEUROPATHY: Chronic | ICD-10-CM

## 2021-02-01 PROCEDURE — 99213 PR OFFICE/OUTPT VISIT, EST, LEVL III, 20-29 MIN: ICD-10-PCS | Mod: S$GLB,,, | Performed by: SPECIALIST

## 2021-02-01 PROCEDURE — 99213 OFFICE O/P EST LOW 20 MIN: CPT | Mod: S$GLB,,, | Performed by: SPECIALIST

## 2021-02-01 RX ORDER — GABAPENTIN 600 MG/1
600 TABLET ORAL 2 TIMES DAILY
Qty: 60 TABLET | Refills: 1 | Status: SHIPPED | OUTPATIENT
Start: 2021-02-01 | End: 2021-04-13 | Stop reason: SDUPTHER

## 2021-02-01 RX ORDER — AMOXICILLIN 875 MG/1
875 TABLET, FILM COATED ORAL
COMMUNITY
End: 2022-01-13 | Stop reason: ALTCHOICE

## 2021-02-02 ENCOUNTER — TELEPHONE (OUTPATIENT)
Dept: UROLOGY | Facility: CLINIC | Age: 41
End: 2021-02-02

## 2021-02-09 ENCOUNTER — TELEPHONE (OUTPATIENT)
Dept: UROLOGY | Facility: CLINIC | Age: 41
End: 2021-02-09

## 2021-02-25 ENCOUNTER — OFFICE VISIT (OUTPATIENT)
Dept: PRIMARY CARE CLINIC | Facility: CLINIC | Age: 41
End: 2021-02-25
Payer: MEDICAID

## 2021-02-25 ENCOUNTER — OFFICE VISIT (OUTPATIENT)
Dept: UROLOGY | Facility: CLINIC | Age: 41
End: 2021-02-25
Payer: MEDICAID

## 2021-02-25 VITALS
HEIGHT: 71 IN | SYSTOLIC BLOOD PRESSURE: 122 MMHG | TEMPERATURE: 99 F | OXYGEN SATURATION: 95 % | BODY MASS INDEX: 36.73 KG/M2 | WEIGHT: 262.38 LBS | HEART RATE: 76 BPM | DIASTOLIC BLOOD PRESSURE: 78 MMHG

## 2021-02-25 VITALS
WEIGHT: 262 LBS | DIASTOLIC BLOOD PRESSURE: 58 MMHG | BODY MASS INDEX: 36.68 KG/M2 | HEART RATE: 85 BPM | HEIGHT: 71 IN | SYSTOLIC BLOOD PRESSURE: 125 MMHG

## 2021-02-25 DIAGNOSIS — K62.5 RECTAL BLEEDING: Primary | ICD-10-CM

## 2021-02-25 DIAGNOSIS — H53.8 BLURRY VISION: ICD-10-CM

## 2021-02-25 DIAGNOSIS — N50.812 PAIN IN LEFT TESTICLE: ICD-10-CM

## 2021-02-25 DIAGNOSIS — R10.32 LEFT INGUINAL PAIN: ICD-10-CM

## 2021-02-25 DIAGNOSIS — R10.9 LEFT FLANK PAIN: Primary | ICD-10-CM

## 2021-02-25 PROCEDURE — 99213 PR OFFICE/OUTPT VISIT, EST, LEVL III, 20-29 MIN: ICD-10-PCS | Mod: S$GLB,,, | Performed by: INTERNAL MEDICINE

## 2021-02-25 PROCEDURE — 99213 OFFICE O/P EST LOW 20 MIN: CPT | Mod: S$GLB,,, | Performed by: INTERNAL MEDICINE

## 2021-02-25 PROCEDURE — 99213 PR OFFICE/OUTPT VISIT, EST, LEVL III, 20-29 MIN: ICD-10-PCS | Mod: S$GLB,,, | Performed by: SPECIALIST

## 2021-02-25 PROCEDURE — 99213 OFFICE O/P EST LOW 20 MIN: CPT | Mod: S$GLB,,, | Performed by: SPECIALIST

## 2021-02-25 RX ORDER — ALBUTEROL SULFATE 90 UG/1
AEROSOL, METERED RESPIRATORY (INHALATION)
COMMUNITY
Start: 2021-01-27 | End: 2022-08-31 | Stop reason: SDUPTHER

## 2021-02-26 LAB
BASOPHILS # BLD AUTO: 39 CELLS/UL (ref 0–200)
BASOPHILS NFR BLD AUTO: 0.5 %
BUN SERPL-MCNC: 13 MG/DL (ref 7–25)
BUN/CREAT SERPL: NORMAL (CALC) (ref 6–22)
CALCIUM SERPL-MCNC: 9.3 MG/DL (ref 8.6–10.3)
CHLORIDE SERPL-SCNC: 103 MMOL/L (ref 98–110)
CO2 SERPL-SCNC: 31 MMOL/L (ref 20–32)
CREAT SERPL-MCNC: 1.09 MG/DL (ref 0.6–1.35)
EOSINOPHIL # BLD AUTO: 140 CELLS/UL (ref 15–500)
EOSINOPHIL NFR BLD AUTO: 1.8 %
ERYTHROCYTE [DISTWIDTH] IN BLOOD BY AUTOMATED COUNT: 12.3 % (ref 11–15)
GFRSERPLBLD MDRD-ARVRAT: 83 ML/MIN/1.73M2
GLUCOSE SERPL-MCNC: 93 MG/DL (ref 65–99)
HCT VFR BLD AUTO: 46.4 % (ref 38.5–50)
HGB BLD-MCNC: 15.6 G/DL (ref 13.2–17.1)
LYMPHOCYTES # BLD AUTO: 1927 CELLS/UL (ref 850–3900)
LYMPHOCYTES NFR BLD AUTO: 24.7 %
MCH RBC QN AUTO: 29.9 PG (ref 27–33)
MCHC RBC AUTO-ENTMCNC: 33.6 G/DL (ref 32–36)
MCV RBC AUTO: 88.9 FL (ref 80–100)
MONOCYTES # BLD AUTO: 827 CELLS/UL (ref 200–950)
MONOCYTES NFR BLD AUTO: 10.6 %
NEUTROPHILS # BLD AUTO: 4867 CELLS/UL (ref 1500–7800)
NEUTROPHILS NFR BLD AUTO: 62.4 %
PLATELET # BLD AUTO: 264 THOUSAND/UL (ref 140–400)
PMV BLD REES-ECKER: 9.8 FL (ref 7.5–12.5)
POTASSIUM SERPL-SCNC: 4.7 MMOL/L (ref 3.5–5.3)
RBC # BLD AUTO: 5.22 MILLION/UL (ref 4.2–5.8)
SODIUM SERPL-SCNC: 140 MMOL/L (ref 135–146)
WBC # BLD AUTO: 7.8 THOUSAND/UL (ref 3.8–10.8)

## 2021-03-12 ENCOUNTER — TELEPHONE (OUTPATIENT)
Dept: PRIMARY CARE CLINIC | Facility: CLINIC | Age: 41
End: 2021-03-12

## 2021-03-15 ENCOUNTER — TELEPHONE (OUTPATIENT)
Dept: PRIMARY CARE CLINIC | Facility: CLINIC | Age: 41
End: 2021-03-15

## 2021-03-26 ENCOUNTER — PROCEDURE VISIT (OUTPATIENT)
Dept: UROLOGY | Facility: CLINIC | Age: 41
End: 2021-03-26
Payer: MEDICAID

## 2021-03-26 VITALS
RESPIRATION RATE: 18 BRPM | HEIGHT: 71 IN | BODY MASS INDEX: 36.4 KG/M2 | DIASTOLIC BLOOD PRESSURE: 59 MMHG | SYSTOLIC BLOOD PRESSURE: 125 MMHG | WEIGHT: 260 LBS | HEART RATE: 85 BPM

## 2021-03-26 DIAGNOSIS — N50.812 PAIN IN LEFT TESTICLE: ICD-10-CM

## 2021-03-26 DIAGNOSIS — R10.32 LEFT INGUINAL PAIN: ICD-10-CM

## 2021-03-26 PROCEDURE — 64425 NJX AA&/STRD II IH NERVES: CPT | Mod: LT,S$GLB,, | Performed by: SPECIALIST

## 2021-03-26 PROCEDURE — 64425 PR NERVE BLOCK INJ, ANES/STEROID, ILIOINGUINAL/ILIOHYPOGASTRIC: ICD-10-PCS | Mod: LT,S$GLB,, | Performed by: SPECIALIST

## 2021-04-12 ENCOUNTER — TELEPHONE (OUTPATIENT)
Dept: UROLOGY | Facility: CLINIC | Age: 41
End: 2021-04-12

## 2021-04-13 DIAGNOSIS — G62.9 NEUROPATHY: Chronic | ICD-10-CM

## 2021-04-14 RX ORDER — GABAPENTIN 600 MG/1
600 TABLET ORAL 2 TIMES DAILY
Qty: 60 TABLET | Refills: 1 | Status: SHIPPED | OUTPATIENT
Start: 2021-04-14 | End: 2021-04-26

## 2021-06-07 ENCOUNTER — OFFICE VISIT (OUTPATIENT)
Dept: PRIMARY CARE CLINIC | Facility: CLINIC | Age: 41
End: 2021-06-07
Payer: MEDICAID

## 2021-06-07 ENCOUNTER — OFFICE VISIT (OUTPATIENT)
Dept: SURGERY | Facility: CLINIC | Age: 41
End: 2021-06-07
Payer: MEDICAID

## 2021-06-07 VITALS
HEART RATE: 80 BPM | DIASTOLIC BLOOD PRESSURE: 77 MMHG | WEIGHT: 265 LBS | OXYGEN SATURATION: 97 % | SYSTOLIC BLOOD PRESSURE: 119 MMHG | HEIGHT: 71 IN | TEMPERATURE: 99 F | BODY MASS INDEX: 37.1 KG/M2

## 2021-06-07 VITALS
WEIGHT: 267 LBS | HEART RATE: 74 BPM | RESPIRATION RATE: 17 BRPM | OXYGEN SATURATION: 98 % | SYSTOLIC BLOOD PRESSURE: 118 MMHG | HEIGHT: 71 IN | DIASTOLIC BLOOD PRESSURE: 77 MMHG | BODY MASS INDEX: 37.38 KG/M2

## 2021-06-07 DIAGNOSIS — L98.9 BACK SKIN LESION: Primary | ICD-10-CM

## 2021-06-07 DIAGNOSIS — K21.9 GASTROESOPHAGEAL REFLUX DISEASE: ICD-10-CM

## 2021-06-07 DIAGNOSIS — E07.9 THYROID MASS: Primary | ICD-10-CM

## 2021-06-07 DIAGNOSIS — I10 HTN (HYPERTENSION), BENIGN: Chronic | ICD-10-CM

## 2021-06-07 DIAGNOSIS — G62.9 NEUROPATHY: Chronic | ICD-10-CM

## 2021-06-07 PROCEDURE — 99213 PR OFFICE/OUTPT VISIT, EST, LEVL III, 20-29 MIN: ICD-10-PCS | Mod: S$GLB,,, | Performed by: SURGERY

## 2021-06-07 PROCEDURE — 99213 OFFICE O/P EST LOW 20 MIN: CPT | Mod: S$GLB,,, | Performed by: SURGERY

## 2021-06-07 PROCEDURE — 99213 OFFICE O/P EST LOW 20 MIN: CPT | Mod: S$GLB,,, | Performed by: INTERNAL MEDICINE

## 2021-06-07 PROCEDURE — 99213 PR OFFICE/OUTPT VISIT, EST, LEVL III, 20-29 MIN: ICD-10-PCS | Mod: S$GLB,,, | Performed by: INTERNAL MEDICINE

## 2021-06-07 RX ORDER — LISINOPRIL 10 MG/1
10 TABLET ORAL DAILY
Qty: 30 TABLET | Refills: 11 | Status: SHIPPED | OUTPATIENT
Start: 2021-06-07 | End: 2022-06-17

## 2021-06-07 RX ORDER — OMEPRAZOLE 40 MG/1
40 CAPSULE, DELAYED RELEASE ORAL EVERY MORNING
Qty: 30 CAPSULE | Refills: 11 | Status: SHIPPED | OUTPATIENT
Start: 2021-06-07 | End: 2022-06-17

## 2021-06-07 RX ORDER — GABAPENTIN 600 MG/1
600 TABLET ORAL 2 TIMES DAILY
Qty: 60 TABLET | Refills: 3 | Status: SHIPPED | OUTPATIENT
Start: 2021-06-07 | End: 2022-01-13 | Stop reason: SDUPTHER

## 2021-06-14 ENCOUNTER — OFFICE VISIT (OUTPATIENT)
Dept: SURGERY | Facility: CLINIC | Age: 41
End: 2021-06-14
Payer: MEDICAID

## 2021-06-14 DIAGNOSIS — L91.0 SCARRING, HYPERTROPHIC: Primary | ICD-10-CM

## 2021-06-14 PROCEDURE — 99212 OFFICE O/P EST SF 10 MIN: CPT | Mod: S$GLB,,, | Performed by: REGISTERED NURSE

## 2021-06-14 PROCEDURE — 99212 PR OFFICE/OUTPT VISIT, EST, LEVL II, 10-19 MIN: ICD-10-PCS | Mod: S$GLB,,, | Performed by: REGISTERED NURSE

## 2021-08-05 ENCOUNTER — TELEPHONE (OUTPATIENT)
Dept: UROLOGY | Facility: CLINIC | Age: 41
End: 2021-08-05

## 2021-08-05 ENCOUNTER — OFFICE VISIT (OUTPATIENT)
Dept: UROLOGY | Facility: CLINIC | Age: 41
End: 2021-08-05
Payer: MEDICAID

## 2021-08-05 DIAGNOSIS — N50.812 PAIN IN LEFT TESTICLE: Primary | ICD-10-CM

## 2021-08-05 PROCEDURE — 99213 PR OFFICE/OUTPT VISIT, EST, LEVL III, 20-29 MIN: ICD-10-PCS | Mod: S$GLB,,, | Performed by: NURSE PRACTITIONER

## 2021-08-05 PROCEDURE — 99213 OFFICE O/P EST LOW 20 MIN: CPT | Mod: S$GLB,,, | Performed by: NURSE PRACTITIONER

## 2021-08-06 ENCOUNTER — TELEPHONE (OUTPATIENT)
Dept: UROLOGY | Facility: CLINIC | Age: 41
End: 2021-08-06

## 2021-09-13 PROBLEM — G89.29 CHRONIC PAIN: Status: ACTIVE | Noted: 2021-09-13

## 2021-12-02 ENCOUNTER — TELEPHONE (OUTPATIENT)
Dept: PRIMARY CARE CLINIC | Facility: CLINIC | Age: 41
End: 2021-12-02
Payer: MEDICAID

## 2022-01-13 ENCOUNTER — OFFICE VISIT (OUTPATIENT)
Dept: PRIMARY CARE CLINIC | Facility: CLINIC | Age: 42
End: 2022-01-13
Payer: MEDICAID

## 2022-01-13 VITALS
HEIGHT: 71 IN | SYSTOLIC BLOOD PRESSURE: 126 MMHG | OXYGEN SATURATION: 97 % | DIASTOLIC BLOOD PRESSURE: 83 MMHG | BODY MASS INDEX: 36.68 KG/M2 | HEART RATE: 70 BPM | WEIGHT: 262 LBS

## 2022-01-13 DIAGNOSIS — M50.30 DEGENERATIVE DISC DISEASE, CERVICAL: Primary | ICD-10-CM

## 2022-01-13 DIAGNOSIS — G62.9 NEUROPATHY: ICD-10-CM

## 2022-01-13 DIAGNOSIS — M25.561 CHRONIC PAIN OF RIGHT KNEE: ICD-10-CM

## 2022-01-13 DIAGNOSIS — I10 HTN (HYPERTENSION), BENIGN: ICD-10-CM

## 2022-01-13 DIAGNOSIS — M54.2 NECK PAIN: ICD-10-CM

## 2022-01-13 DIAGNOSIS — E66.9 OBESITY (BMI 30-39.9): ICD-10-CM

## 2022-01-13 DIAGNOSIS — M54.2 NECK PAIN: Primary | ICD-10-CM

## 2022-01-13 DIAGNOSIS — G89.29 CHRONIC LEFT SHOULDER PAIN: ICD-10-CM

## 2022-01-13 DIAGNOSIS — M25.512 CHRONIC LEFT SHOULDER PAIN: ICD-10-CM

## 2022-01-13 DIAGNOSIS — G89.29 CHRONIC PAIN OF RIGHT KNEE: ICD-10-CM

## 2022-01-13 DIAGNOSIS — G62.9 NEUROPATHY: Chronic | ICD-10-CM

## 2022-01-13 PROCEDURE — 3079F DIAST BP 80-89 MM HG: CPT | Mod: CPTII,S$GLB,, | Performed by: INTERNAL MEDICINE

## 2022-01-13 PROCEDURE — 1159F PR MEDICATION LIST DOCUMENTED IN MEDICAL RECORD: ICD-10-PCS | Mod: CPTII,S$GLB,, | Performed by: INTERNAL MEDICINE

## 2022-01-13 PROCEDURE — 4010F ACE/ARB THERAPY RXD/TAKEN: CPT | Mod: CPTII,S$GLB,, | Performed by: INTERNAL MEDICINE

## 2022-01-13 PROCEDURE — 1159F MED LIST DOCD IN RCRD: CPT | Mod: CPTII,S$GLB,, | Performed by: INTERNAL MEDICINE

## 2022-01-13 PROCEDURE — 3079F PR MOST RECENT DIASTOLIC BLOOD PRESSURE 80-89 MM HG: ICD-10-PCS | Mod: CPTII,S$GLB,, | Performed by: INTERNAL MEDICINE

## 2022-01-13 PROCEDURE — 3008F PR BODY MASS INDEX (BMI) DOCUMENTED: ICD-10-PCS | Mod: CPTII,S$GLB,, | Performed by: INTERNAL MEDICINE

## 2022-01-13 PROCEDURE — 99214 PR OFFICE/OUTPT VISIT, EST, LEVL IV, 30-39 MIN: ICD-10-PCS | Mod: S$GLB,,, | Performed by: INTERNAL MEDICINE

## 2022-01-13 PROCEDURE — 4010F PR ACE/ARB THEARPY RXD/TAKEN: ICD-10-PCS | Mod: CPTII,S$GLB,, | Performed by: INTERNAL MEDICINE

## 2022-01-13 PROCEDURE — 99214 OFFICE O/P EST MOD 30 MIN: CPT | Mod: S$GLB,,, | Performed by: INTERNAL MEDICINE

## 2022-01-13 PROCEDURE — 3074F PR MOST RECENT SYSTOLIC BLOOD PRESSURE < 130 MM HG: ICD-10-PCS | Mod: CPTII,S$GLB,, | Performed by: INTERNAL MEDICINE

## 2022-01-13 PROCEDURE — 3008F BODY MASS INDEX DOCD: CPT | Mod: CPTII,S$GLB,, | Performed by: INTERNAL MEDICINE

## 2022-01-13 PROCEDURE — 3074F SYST BP LT 130 MM HG: CPT | Mod: CPTII,S$GLB,, | Performed by: INTERNAL MEDICINE

## 2022-01-13 RX ORDER — CAMPHOR, MENTHOL, METHYL SALICYLATE 21.56; 41.73; 69.55 MG/1; MG/1; MG/1
PATCH PERCUTANEOUS; TOPICAL; TRANSDERMAL
COMMUNITY

## 2022-01-13 RX ORDER — GABAPENTIN 600 MG/1
600 TABLET ORAL 2 TIMES DAILY
Qty: 180 TABLET | Refills: 3 | Status: SHIPPED | OUTPATIENT
Start: 2022-01-13 | End: 2023-03-01 | Stop reason: SDUPTHER

## 2022-01-13 NOTE — PROGRESS NOTES
Subjective:      Patient ID: Hiren Bautista is a 41 y.o. male.    Chief Complaint: Follow-up (Has seen pain management Adebayo Schulte MD [facility] on 10/28/21/)    HPI     Patient here for follow up. He needs refills of his medications. He is asking for referrals for pain management/orthopedics for his shoulder, knee and neck. He had a cervical MRI in 2016 which showed degenerative disc disease and intimal canal narrowing and foraminal narrowing at C3-C4    Review of Systems   Constitutional: Negative for chills and fever.   Respiratory: Negative for cough, shortness of breath and wheezing.    Cardiovascular: Negative for chest pain, palpitations and leg swelling.   Gastrointestinal: Negative for abdominal pain, constipation, diarrhea, nausea and vomiting.   Genitourinary: Negative for dysuria, frequency and urgency.   Musculoskeletal: Positive for back pain, joint pain, myalgias and neck pain. Negative for falls.   Neurological: Positive for tingling. Negative for dizziness and headaches.     Objective:     Physical Exam  Vitals reviewed.   Constitutional:       Appearance: Normal appearance. He is obese.   HENT:      Head: Normocephalic.   Eyes:      Extraocular Movements: Extraocular movements intact.      Conjunctiva/sclera: Conjunctivae normal.      Pupils: Pupils are equal, round, and reactive to light.   Neck:      Comments: No thyromegaly felt  Cardiovascular:      Rate and Rhythm: Normal rate and regular rhythm.   Pulmonary:      Effort: Pulmonary effort is normal.      Breath sounds: Normal breath sounds.   Abdominal:      General: Bowel sounds are normal.   Musculoskeletal:      Cervical back: Tenderness present.   Lymphadenopathy:      Cervical: No cervical adenopathy.   Skin:     General: Skin is warm.      Capillary Refill: Capillary refill takes less than 2 seconds.   Neurological:      General: No focal deficit present.      Mental Status: He is alert and oriented to person, place, and time.  "     Gait: Gait abnormal.      Comments: Uses cane to walk   Psychiatric:         Mood and Affect: Mood normal.        /83 (BP Location: Right arm, Patient Position: Sitting, BP Method: Medium (Automatic))   Pulse 70   Ht 5' 11" (1.803 m)   Wt 118.8 kg (262 lb)   SpO2 97%   BMI 36.54 kg/m²     Assessment:       ICD-10-CM ICD-9-CM   1. Degenerative disc disease, cervical  M50.30 722.4   2. HTN (hypertension), benign  I10 401.1   3. Neuropathy - ileoinguinal nerve excision - Dr Grimes ([place])  G62.9 355.9   4. Chronic pain of right knee - walking with stick  M25.561 719.46    G89.29 338.29   5. Obesity (BMI 30-39.9)  E66.9 278.00   6. Neuropathy - ileoinguinal nerve excision - Dr Grimes ([place]) Stable G62.9 355.9   7. Neck pain  M54.2 723.1   8. Chronic left shoulder pain  M25.512 719.41    G89.29 338.29       Plan:     Medication List with Changes/Refills   Current Medications    ALBUTEROL (PROVENTIL/VENTOLIN HFA) 90 MCG/ACTUATION INHALER        CAMPHOR-METHYL SALICYL-MENTHOL (SALONPAS) 3.1-10-6 % PTMD    Apply topically.    LISINOPRIL 10 MG TABLET    Take 1 tablet (10 mg total) by mouth once daily.    NAPROXEN (NAPROSYN) 500 MG TABLET    Take 500 mg by mouth 2 (two) times daily as needed.    OMEPRAZOLE (PRILOSEC) 40 MG CAPSULE    Take 1 capsule (40 mg total) by mouth every morning.    STIOLTO RESPIMAT 2.5-2.5 MCG/ACTUATION MIST    INHALE 2 PUFFS BY MOUTH ONCE DAILY AT THE SAME TIME EVERY DAY   Changed and/or Refilled Medications    Modified Medication Previous Medication    GABAPENTIN (NEURONTIN) 600 MG TABLET gabapentin (NEURONTIN) 600 MG tablet       Take 1 tablet (600 mg total) by mouth 2 (two) times daily.    Take 1 tablet (600 mg total) by mouth 2 (two) times daily.   Discontinued Medications    AMOXICILLIN (AMOXIL) 875 MG TABLET    Take 875 mg by mouth every 12 (twelve) hours.    FAMOTIDINE (PEPCID) 20 MG TABLET    TAKE 1 TABLET BY MOUTH EVERY 12 HOURS FOR 10 DAYS        Degenerative " disc disease, cervical  -     Ambulatory referral/consult to Neurosurgery; Future; Expected date: 01/21/2022    HTN (hypertension), benign    Neuropathy - ileoinguinal nerve excision - Dr Grimes ([place])  -     gabapentin (NEURONTIN) 600 MG tablet; Take 1 tablet (600 mg total) by mouth 2 (two) times daily.  Dispense: 180 tablet; Refill: 3    Chronic pain of right knee - walking with stick    Obesity (BMI 30-39.9)    Neuropathy - ileoinguinal nerve excision - Dr Grimes ([place])  Comments:  gabapentin refilled  Orders:  -     gabapentin (NEURONTIN) 600 MG tablet; Take 1 tablet (600 mg total) by mouth 2 (two) times daily.  Dispense: 180 tablet; Refill: 3    Neck pain  -     Cancel: Ambulatory referral/consult to Pain Clinic; Future; Expected date: 01/20/2022    Chronic left shoulder pain  -     Ambulatory referral/consult to Orthopedics; Future; Expected date: 01/20/2022         20-minute visit. 5 minutes spent counseling patient on diet, exercise, and weight loss.       Future Appointments   Date Time Provider Department Center   7/13/2022 11:00 AM [facility]

## 2022-08-30 ENCOUNTER — OFFICE VISIT (OUTPATIENT)
Dept: PRIMARY CARE CLINIC | Facility: CLINIC | Age: 42
End: 2022-08-30
Payer: MEDICAID

## 2022-08-30 VITALS
WEIGHT: 262.81 LBS | BODY MASS INDEX: 36.79 KG/M2 | DIASTOLIC BLOOD PRESSURE: 73 MMHG | SYSTOLIC BLOOD PRESSURE: 109 MMHG | HEART RATE: 75 BPM | HEIGHT: 71 IN | OXYGEN SATURATION: 97 %

## 2022-08-30 DIAGNOSIS — M25.561 CHRONIC PAIN OF RIGHT KNEE: ICD-10-CM

## 2022-08-30 DIAGNOSIS — R06.02 SHORTNESS OF BREATH: ICD-10-CM

## 2022-08-30 DIAGNOSIS — G89.29 CHRONIC PAIN OF RIGHT KNEE: ICD-10-CM

## 2022-08-30 DIAGNOSIS — E66.9 OBESITY (BMI 30-39.9): ICD-10-CM

## 2022-08-30 DIAGNOSIS — I10 HTN (HYPERTENSION), BENIGN: Chronic | ICD-10-CM

## 2022-08-30 DIAGNOSIS — G89.29 CHRONIC LEFT SHOULDER PAIN: ICD-10-CM

## 2022-08-30 DIAGNOSIS — M50.30 DEGENERATIVE DISC DISEASE, CERVICAL: Primary | ICD-10-CM

## 2022-08-30 DIAGNOSIS — M25.512 CHRONIC LEFT SHOULDER PAIN: ICD-10-CM

## 2022-08-30 DIAGNOSIS — E78.5 HYPERLIPIDEMIA, UNSPECIFIED HYPERLIPIDEMIA TYPE: ICD-10-CM

## 2022-08-30 DIAGNOSIS — K21.9 GASTROESOPHAGEAL REFLUX DISEASE: ICD-10-CM

## 2022-08-30 PROCEDURE — 1159F PR MEDICATION LIST DOCUMENTED IN MEDICAL RECORD: ICD-10-PCS | Mod: CPTII,S$GLB,, | Performed by: INTERNAL MEDICINE

## 2022-08-30 PROCEDURE — 3008F PR BODY MASS INDEX (BMI) DOCUMENTED: ICD-10-PCS | Mod: CPTII,S$GLB,, | Performed by: INTERNAL MEDICINE

## 2022-08-30 PROCEDURE — 1159F MED LIST DOCD IN RCRD: CPT | Mod: CPTII,S$GLB,, | Performed by: INTERNAL MEDICINE

## 2022-08-30 PROCEDURE — 3078F DIAST BP <80 MM HG: CPT | Mod: CPTII,S$GLB,, | Performed by: INTERNAL MEDICINE

## 2022-08-30 PROCEDURE — 4010F PR ACE/ARB THEARPY RXD/TAKEN: ICD-10-PCS | Mod: CPTII,S$GLB,, | Performed by: INTERNAL MEDICINE

## 2022-08-30 PROCEDURE — 3074F PR MOST RECENT SYSTOLIC BLOOD PRESSURE < 130 MM HG: ICD-10-PCS | Mod: CPTII,S$GLB,, | Performed by: INTERNAL MEDICINE

## 2022-08-30 PROCEDURE — 99214 OFFICE O/P EST MOD 30 MIN: CPT | Mod: S$GLB,,, | Performed by: INTERNAL MEDICINE

## 2022-08-30 PROCEDURE — 3078F PR MOST RECENT DIASTOLIC BLOOD PRESSURE < 80 MM HG: ICD-10-PCS | Mod: CPTII,S$GLB,, | Performed by: INTERNAL MEDICINE

## 2022-08-30 PROCEDURE — 99214 PR OFFICE/OUTPT VISIT, EST, LEVL IV, 30-39 MIN: ICD-10-PCS | Mod: S$GLB,,, | Performed by: INTERNAL MEDICINE

## 2022-08-30 PROCEDURE — 4010F ACE/ARB THERAPY RXD/TAKEN: CPT | Mod: CPTII,S$GLB,, | Performed by: INTERNAL MEDICINE

## 2022-08-30 PROCEDURE — 3008F BODY MASS INDEX DOCD: CPT | Mod: CPTII,S$GLB,, | Performed by: INTERNAL MEDICINE

## 2022-08-30 PROCEDURE — 3074F SYST BP LT 130 MM HG: CPT | Mod: CPTII,S$GLB,, | Performed by: INTERNAL MEDICINE

## 2022-08-30 RX ORDER — LISINOPRIL 10 MG/1
10 TABLET ORAL DAILY
Qty: 90 TABLET | Refills: 3 | Status: SHIPPED | OUTPATIENT
Start: 2022-08-30 | End: 2023-10-24

## 2022-08-30 RX ORDER — OMEPRAZOLE 40 MG/1
40 CAPSULE, DELAYED RELEASE ORAL EVERY MORNING
Qty: 90 CAPSULE | Refills: 3 | Status: SHIPPED | OUTPATIENT
Start: 2022-08-30 | End: 2023-10-24

## 2022-08-30 NOTE — PROGRESS NOTES
"Subjective:      Patient ID: Hiren Bautista is a 41 y.o. male.    Chief Complaint: Follow-up and Referral (Referral to Ortho)    HPI    Patient here for follow up and refill of medications. I had referred him to several providers about 6 months ago but he never followed up on that as he states his father was ill. He needs more referrals for his neck, shoulder and knee.       Review of Systems   Constitutional:  Negative for chills, fever and weight loss.   Respiratory:  Negative for cough, shortness of breath and wheezing.    Cardiovascular:  Negative for chest pain, palpitations and leg swelling.   Gastrointestinal:  Negative for abdominal pain, constipation, diarrhea, nausea and vomiting.   Genitourinary:  Negative for dysuria, frequency and urgency.   Musculoskeletal:  Positive for joint pain.   Skin:  Negative for rash.   Neurological:  Negative for dizziness and headaches.   Objective:     Physical Exam  Constitutional:       Appearance: Normal appearance. He is obese.   HENT:      Head: Normocephalic.   Eyes:      Extraocular Movements: Extraocular movements intact.      Conjunctiva/sclera: Conjunctivae normal.      Pupils: Pupils are equal, round, and reactive to light.   Cardiovascular:      Rate and Rhythm: Normal rate and regular rhythm.   Pulmonary:      Effort: Pulmonary effort is normal.      Breath sounds: Normal breath sounds.   Abdominal:      General: Bowel sounds are normal.   Musculoskeletal:      Right lower leg: No edema.      Left lower leg: No edema.   Skin:     General: Skin is warm.      Capillary Refill: Capillary refill takes less than 2 seconds.   Neurological:      General: No focal deficit present.      Mental Status: He is alert and oriented to person, place, and time.      Comments: Uses cane for ambulation     /73 (BP Location: Right arm, Patient Position: Sitting, BP Method: Medium (Automatic))   Pulse 75   Ht 5' 11" (1.803 m)   Wt 119.2 kg (262 lb 12.8 oz)   SpO2 97%   " BMI 36.65 kg/m²     Assessment:       ICD-10-CM ICD-9-CM   1. Degenerative disc disease, cervical  M50.30 722.4   2. Gastroesophageal reflux disease  K21.9 530.81   3. HTN (hypertension), benign  I10 401.1   4. Hyperlipidemia, unspecified hyperlipidemia type  E78.5 272.4   5. Chronic left shoulder pain  M25.512 719.41    G89.29 338.29   6. Shortness of breath  R06.02 786.05   7. Chronic pain of right knee  M25.561 719.46    G89.29 338.29   8. Obesity (BMI 30-39.9)  E66.9 278.00       Plan:     Medication List with Changes/Refills   Current Medications    CAMPHOR-METHYL SALICYL-MENTHOL (SALONPAS) 3.1-10-6 % PTMD    Apply topically.    GABAPENTIN (NEURONTIN) 600 MG TABLET    Take 1 tablet (600 mg total) by mouth 2 (two) times daily.    NAPROXEN (NAPROSYN) 500 MG TABLET    Take 500 mg by mouth 2 (two) times daily as needed.   Changed and/or Refilled Medications    Modified Medication Previous Medication    ALBUTEROL (PROVENTIL/VENTOLIN HFA) 90 MCG/ACTUATION INHALER albuterol (PROVENTIL/VENTOLIN HFA) 90 mcg/actuation inhaler       Inhale 1 puff into the lungs every 6 (six) hours as needed for Wheezing.        LISINOPRIL 10 MG TABLET lisinopriL 10 MG tablet       Take 1 tablet (10 mg total) by mouth once daily.    Take 1 tablet by mouth once daily    OMEPRAZOLE (PRILOSEC) 40 MG CAPSULE omeprazole (PRILOSEC) 40 MG capsule       Take 1 capsule (40 mg total) by mouth every morning.    Take 1 capsule by mouth in the morning    STIOLTO RESPIMAT 2.5-2.5 MCG/ACTUATION MIST STIOLTO RESPIMAT 2.5-2.5 mcg/actuation Mist       INHALE 2 PUFFS BY MOUTH ONCE DAILY AT THE SAME TIME EVERY DAY Strength: 2.5-2.5 mcg/actuation    INHALE 2 PUFFS BY MOUTH ONCE DAILY AT THE SAME TIME EVERY DAY        Degenerative disc disease, cervical  -     Cancel: Ambulatory referral/consult to Neurosurgery; Future; Expected date: 09/06/2022  -     Ambulatory referral/consult to Orthopedics; Future; Expected date: 09/06/2022  -     Ambulatory  referral/consult to Neurosurgery; Future; Expected date: 09/08/2022    Gastroesophageal reflux disease  -     omeprazole (PRILOSEC) 40 MG capsule; Take 1 capsule (40 mg total) by mouth every morning.  Dispense: 90 capsule; Refill: 3    HTN (hypertension), benign  Comments:  lisinopril refilled  Orders:  -     lisinopriL 10 MG tablet; Take 1 tablet (10 mg total) by mouth once daily.  Dispense: 90 tablet; Refill: 3  -     Comprehensive Metabolic Panel; Future; Expected date: 08/30/2022    Hyperlipidemia, unspecified hyperlipidemia type  -     Lipid Panel; Future; Expected date: 08/30/2022    Chronic left shoulder pain  -     Ambulatory referral/consult to Orthopedics; Future; Expected date: 09/06/2022    Shortness of breath  -     Complete PFT with bronchodilator; Future    Chronic pain of right knee  -     Ambulatory referral/consult to Orthopedics; Future; Expected date: 09/06/2022    Obesity (BMI 30-39.9)         Patient is on albuterol and stiolto but there is no diagnosis of asthma or copd in his chart, will need to evaluate further           Future Appointments   Date Time Provider Department Center   2/28/2023  8:20 AM [facility]

## 2022-08-31 LAB
ALBUMIN SERPL-MCNC: 4.5 G/DL (ref 3.6–5.1)
ALBUMIN/GLOB SERPL: 1.6 (CALC) (ref 1–2.5)
ALP SERPL-CCNC: 58 U/L (ref 36–130)
ALT SERPL-CCNC: 43 U/L (ref 9–46)
AST SERPL-CCNC: 23 U/L (ref 10–40)
BILIRUB SERPL-MCNC: 1 MG/DL (ref 0.2–1.2)
BUN SERPL-MCNC: 18 MG/DL (ref 7–25)
BUN/CREAT SERPL: NORMAL (CALC) (ref 6–22)
CALCIUM SERPL-MCNC: 9.6 MG/DL (ref 8.6–10.3)
CHLORIDE SERPL-SCNC: 102 MMOL/L (ref 98–110)
CHOLEST SERPL-MCNC: 212 MG/DL
CHOLEST/HDLC SERPL: 6.8 (CALC)
CO2 SERPL-SCNC: 30 MMOL/L (ref 20–32)
CREAT SERPL-MCNC: 1.05 MG/DL (ref 0.6–1.29)
EGFR: 90 ML/MIN/1.73M2
GLOBULIN SER CALC-MCNC: 2.8 G/DL (CALC) (ref 1.9–3.7)
GLUCOSE SERPL-MCNC: 82 MG/DL (ref 65–99)
HDLC SERPL-MCNC: 31 MG/DL
LDLC SERPL CALC-MCNC: 147 MG/DL (CALC)
NONHDLC SERPL-MCNC: 181 MG/DL (CALC)
POTASSIUM SERPL-SCNC: 5.2 MMOL/L (ref 3.5–5.3)
PROT SERPL-MCNC: 7.3 G/DL (ref 6.1–8.1)
SODIUM SERPL-SCNC: 139 MMOL/L (ref 135–146)
TRIGL SERPL-MCNC: 207 MG/DL

## 2022-08-31 RX ORDER — ALBUTEROL SULFATE 90 UG/1
1 AEROSOL, METERED RESPIRATORY (INHALATION) EVERY 6 HOURS PRN
Qty: 18 G | Refills: 3 | Status: SHIPPED | OUTPATIENT
Start: 2022-08-31 | End: 2023-02-28 | Stop reason: SDUPTHER

## 2022-08-31 RX ORDER — TIOTROPIUM BROMIDE AND OLODATEROL 3.124; 2.736 UG/1; UG/1
SPRAY, METERED RESPIRATORY (INHALATION)
Qty: 18 G | Refills: 3 | Status: SHIPPED | OUTPATIENT
Start: 2022-08-31 | End: 2023-02-28 | Stop reason: SDUPTHER

## 2022-08-31 NOTE — TELEPHONE ENCOUNTER
----- Message from Lea Chester sent at 8/31/2022  8:23 AM CDT -----  Contact: SELF  Type:  RX Refill Request    Who Called: Hiren Bautista   Refill or New Rx: REFILL  RX Name and Strength:   1) albuterol (PROVENTIL/VENTOLIN HFA) 90 mcg/actuation inhaler  2) STIOLTO RESPIMAT 2.5-2.5 mcg/actuation Mist      Preferred Pharmacy with phone number: [facility] - [place], [place] - [address] [address]  [address] [address]  [facility] [address]  Phone: [phone] Fax: [phone]     Local or Mail Order: LOCAL  Ordering Provider: JAGDEEP NAVARRO  Would the patient rather a call back or a response via [facility]?  CALL BACK   Best Call Back Number: [phone]   Additional Information: N/A

## 2022-09-07 ENCOUNTER — TELEPHONE (OUTPATIENT)
Dept: PRIMARY CARE CLINIC | Facility: CLINIC | Age: 42
End: 2022-09-07
Payer: MEDICAID

## 2022-09-07 NOTE — TELEPHONE ENCOUNTER
Reprinted both neuro and ortho referral's. I have removed the ortho referral name and put [facility], with the fax number below.     ----- Message from Lea Briggs sent at 9/7/2022  8:34 AM CDT -----  Contact: self  Per patient: The referral needs to be sent again for his spine, left shoulder, left knee, and neck as an open referral. In other words, needs to have it sent with no particular doctor's name on it. He is being advised that it has an [facility] doctor's name on it in [place], although it was sent to [place]. He was given a fax number for Dr Ying to send it to [facility] again: [phone]. Patient's call back number is [phone]

## 2022-09-12 ENCOUNTER — TELEPHONE (OUTPATIENT)
Dept: PRIMARY CARE CLINIC | Facility: CLINIC | Age: 42
End: 2022-09-12
Payer: MEDICAID

## 2022-09-12 NOTE — TELEPHONE ENCOUNTER
This was all completed on last week after his phone call. His number is giving a busy signal x2 as well the second number attached.     ----- Message from Smiley Georges sent at 9/12/2022  2:05 PM CDT -----  Contact: pt  Pt calling about referral to ortho clinic Pt request it to be resend to ortho clinic in [place].  There is no need to put doctor name of request.  Pt can be reached at [phone]   He stated just put Ortho Dept [place] with no [facility] doctor name.      Thanks,

## 2022-09-13 ENCOUNTER — TELEPHONE (OUTPATIENT)
Dept: PRIMARY CARE CLINIC | Facility: CLINIC | Age: 42
End: 2022-09-13
Payer: MEDICAID

## 2022-09-13 NOTE — TELEPHONE ENCOUNTER
----- Message from Davie Robertson sent at 9/13/2022 10:44 AM CDT -----  Contact: pt  .Type:  Patient Returning Call    Who Called:piero   Who Left Message for Patient:  Does the patient know what this is regarding?:referral to orthopedic has not been received   Would the patient rather a call back or a response via [facility]?   Best Call Back Number:.[phone]    Additional Information: referral has [place] address but a [facility] iqbal dr needs to be resent   Fax- [phone]  For shoulder and left knee   Address as orthopedic dr in [place]

## 2022-09-13 NOTE — TELEPHONE ENCOUNTER
----- Message from Smiley Georges sent at 9/13/2022 12:17 PM CDT -----  Contact: [phone]  [facility] calling about  pt referral they needs mri of cervical and lumbar  They can be reached at [phone]    Thanks,

## 2022-09-13 NOTE — TELEPHONE ENCOUNTER
This referral has BEEN COMPLETED since last week. I have tried call the number below as well as his contact from his chart. Neither of these numbers are accepting calls.         My Note Signed   12:28 PM     ----- Message from Davie Robertson sent at 9/13/2022 10:44 AM CDT -----  Contact: pt  .Type:  Patient Returning Call     Who Called:piero   Who Left Message for Patient:  Does the patient know what this is regarding?:referral to orthopedic has not been received   Would the patient rather a call back or a response via [facility]?   Best Call Back Number:.[phone]     Additional Information: referral has [place] address but a [place] iqbal dr needs to be resent   Fax- [phone]  For shoulder and left knee   Address as orthopedic dr in [place]

## 2023-02-28 ENCOUNTER — OFFICE VISIT (OUTPATIENT)
Dept: PRIMARY CARE CLINIC | Facility: CLINIC | Age: 43
End: 2023-02-28
Payer: MEDICAID

## 2023-02-28 VITALS
BODY MASS INDEX: 37.1 KG/M2 | OXYGEN SATURATION: 98 % | HEIGHT: 71 IN | SYSTOLIC BLOOD PRESSURE: 126 MMHG | HEART RATE: 70 BPM | WEIGHT: 265 LBS | DIASTOLIC BLOOD PRESSURE: 77 MMHG

## 2023-02-28 DIAGNOSIS — R06.02 SHORTNESS OF BREATH: Primary | ICD-10-CM

## 2023-02-28 DIAGNOSIS — G62.9 NEUROPATHY: ICD-10-CM

## 2023-02-28 DIAGNOSIS — I10 HTN (HYPERTENSION), BENIGN: Chronic | ICD-10-CM

## 2023-02-28 DIAGNOSIS — M50.20 HERNIATION OF CERVICAL INTERVERTEBRAL DISC: ICD-10-CM

## 2023-02-28 DIAGNOSIS — G89.29 CHRONIC LEFT SHOULDER PAIN: ICD-10-CM

## 2023-02-28 DIAGNOSIS — M25.512 CHRONIC LEFT SHOULDER PAIN: ICD-10-CM

## 2023-02-28 PROCEDURE — 1159F MED LIST DOCD IN RCRD: CPT | Mod: CPTII,S$GLB,, | Performed by: INTERNAL MEDICINE

## 2023-02-28 PROCEDURE — 99214 PR OFFICE/OUTPT VISIT, EST, LEVL IV, 30-39 MIN: ICD-10-PCS | Mod: S$GLB,,, | Performed by: INTERNAL MEDICINE

## 2023-02-28 PROCEDURE — 3078F PR MOST RECENT DIASTOLIC BLOOD PRESSURE < 80 MM HG: ICD-10-PCS | Mod: CPTII,S$GLB,, | Performed by: INTERNAL MEDICINE

## 2023-02-28 PROCEDURE — 1159F PR MEDICATION LIST DOCUMENTED IN MEDICAL RECORD: ICD-10-PCS | Mod: CPTII,S$GLB,, | Performed by: INTERNAL MEDICINE

## 2023-02-28 PROCEDURE — 3074F PR MOST RECENT SYSTOLIC BLOOD PRESSURE < 130 MM HG: ICD-10-PCS | Mod: CPTII,S$GLB,, | Performed by: INTERNAL MEDICINE

## 2023-02-28 PROCEDURE — 3078F DIAST BP <80 MM HG: CPT | Mod: CPTII,S$GLB,, | Performed by: INTERNAL MEDICINE

## 2023-02-28 PROCEDURE — 4010F ACE/ARB THERAPY RXD/TAKEN: CPT | Mod: CPTII,S$GLB,, | Performed by: INTERNAL MEDICINE

## 2023-02-28 PROCEDURE — 3074F SYST BP LT 130 MM HG: CPT | Mod: CPTII,S$GLB,, | Performed by: INTERNAL MEDICINE

## 2023-02-28 PROCEDURE — 3008F PR BODY MASS INDEX (BMI) DOCUMENTED: ICD-10-PCS | Mod: CPTII,S$GLB,, | Performed by: INTERNAL MEDICINE

## 2023-02-28 PROCEDURE — 4010F PR ACE/ARB THEARPY RXD/TAKEN: ICD-10-PCS | Mod: CPTII,S$GLB,, | Performed by: INTERNAL MEDICINE

## 2023-02-28 PROCEDURE — 3008F BODY MASS INDEX DOCD: CPT | Mod: CPTII,S$GLB,, | Performed by: INTERNAL MEDICINE

## 2023-02-28 PROCEDURE — 99214 OFFICE O/P EST MOD 30 MIN: CPT | Mod: S$GLB,,, | Performed by: INTERNAL MEDICINE

## 2023-02-28 RX ORDER — TIOTROPIUM BROMIDE AND OLODATEROL 3.124; 2.736 UG/1; UG/1
SPRAY, METERED RESPIRATORY (INHALATION)
Qty: 18 G | Refills: 3 | Status: SHIPPED | OUTPATIENT
Start: 2023-02-28

## 2023-02-28 RX ORDER — ALBUTEROL SULFATE 90 UG/1
1 AEROSOL, METERED RESPIRATORY (INHALATION) EVERY 6 HOURS PRN
Qty: 18 G | Refills: 3 | Status: SHIPPED | OUTPATIENT
Start: 2023-02-28

## 2023-02-28 NOTE — PROGRESS NOTES
Subjective:      Patient ID: Hiren Bautista is a 42 y.o. male.    Chief Complaint: Medication Refill, Follow-up, and Referral (GI surgeon-for colonoscopy. He states he does see blood at times on wiping. )    HPI      Past Medical History:   Diagnosis Date    Arthritis     Colon polyp     Disorder of kidney and ureter     Hypertension     Lung disease     Thyroid disease     Urinary tract infection      Patient here for follow up.     He is seeing [facility] in [place] and they are working on controlling his pain cervical and shoulder region, multiple images were done, patient will continue follow up with them.      Dr Graham Rowan is his pulmonologist from [facility] however patient states he hasn't seen him in some time and I don't see any recent notes/PFT. He is on Stiolto and albuterol. States his lung problems are from the mesh s/p inguinal hernia repair    He has chronic left testicular pain that started following left inguinal hernia repair. He is now s/p ilioinguinal nerve block on 9/13/21. He has not found pain management yet    Last colonoscopy in 2020 and was told to repeat 10 years. States he has hard stool/constipation and notices bright red blood with wiping. No current bleeding reported      Review of Systems   Constitutional:  Negative for chills and fever.   Respiratory:  Positive for shortness of breath. Negative for cough and wheezing.    Cardiovascular:  Negative for chest pain, palpitations and leg swelling.   Gastrointestinal:  Positive for blood in stool and constipation. Negative for abdominal pain, diarrhea, nausea and vomiting.   Genitourinary:  Negative for dysuria, frequency and urgency.   Musculoskeletal:  Positive for joint pain and neck pain.   Skin:  Negative for rash.   Neurological:  Negative for dizziness and headaches.   Endo/Heme/Allergies:  Does not bruise/bleed easily.   Objective:     Physical Exam  Vitals reviewed.   Constitutional:       Appearance: Normal  "appearance. He is obese.   HENT:      Head: Normocephalic.   Eyes:      Extraocular Movements: Extraocular movements intact.      Conjunctiva/sclera: Conjunctivae normal.      Pupils: Pupils are equal, round, and reactive to light.   Cardiovascular:      Rate and Rhythm: Normal rate and regular rhythm.   Pulmonary:      Effort: Pulmonary effort is normal.      Breath sounds: Normal breath sounds.   Abdominal:      General: Bowel sounds are normal.   Musculoskeletal:      Right lower leg: No edema.      Left lower leg: No edema.   Skin:     General: Skin is warm.      Capillary Refill: Capillary refill takes less than 2 seconds.   Neurological:      General: No focal deficit present.      Mental Status: He is alert and oriented to person, place, and time.   Psychiatric:         Mood and Affect: Mood normal.     /77 (BP Location: Right arm, Patient Position: Sitting, BP Method: Medium (Automatic))   Pulse 70   Ht 5' 11" (1.803 m)   Wt 120.2 kg (265 lb)   SpO2 98%   BMI 36.96 kg/m²     Assessment:       ICD-10-CM ICD-9-CM   1. Shortness of breath  R06.02 786.05   2. Neuropathy  G62.9 355.9   3. HTN (hypertension), benign  I10 401.1   4. Chronic left shoulder pain  M25.512 719.41    G89.29 338.29   5. Herniation of cervical intervertebral disc  M50.20 722.0       Plan:     Medication List with Changes/Refills   Current Medications    CAMPHOR-METHYL SALICYL-MENTHOL (SALONPAS) 3.1-10-6 % PTMD    Apply topically.    DICLOFENAC SODIUM (VOLTAREN) 1 % GEL    Apply 2 g topically 4 (four) times daily as needed (pain).    GABAPENTIN (NEURONTIN) 600 MG TABLET    Take 1 tablet (600 mg total) by mouth 2 (two) times daily.    LISINOPRIL 10 MG TABLET    Take 1 tablet (10 mg total) by mouth once daily.    NABUMETONE (RELAFEN) 500 MG TABLET    Take 1 tablet (500 mg total) by mouth 2 (two) times daily as needed for Pain. Do not take with other NSAIDs.    OMEPRAZOLE (PRILOSEC) 40 MG CAPSULE    Take 1 capsule (40 mg total) by " mouth every morning.   Changed and/or Refilled Medications    Modified Medication Previous Medication    ALBUTEROL (PROVENTIL/VENTOLIN HFA) 90 MCG/ACTUATION INHALER albuterol (PROVENTIL/VENTOLIN HFA) 90 mcg/actuation inhaler       Inhale 1 puff into the lungs every 6 (six) hours as needed for Wheezing.    Inhale 1 puff into the lungs every 6 (six) hours as needed for Wheezing.    STIOLTO RESPIMAT 2.5-2.5 MCG/ACTUATION MIST STIOLTO RESPIMAT 2.5-2.5 mcg/actuation Mist       INHALE 2 PUFFS BY MOUTH ONCE DAILY AT THE SAME TIME EVERY DAY Strength: 2.5-2.5 mcg/actuation    INHALE 2 PUFFS BY MOUTH ONCE DAILY AT THE SAME TIME EVERY DAY Strength: 2.5-2.5 mcg/actuation        1. Shortness of breath  -     albuterol (PROVENTIL/VENTOLIN HFA) 90 mcg/actuation inhaler; Inhale 1 puff into the lungs every 6 (six) hours as needed for Wheezing.  Dispense: 18 g; Refill: 3  -     STIOLTO RESPIMAT 2.5-2.5 mcg/actuation Mist; INHALE 2 PUFFS BY MOUTH ONCE DAILY AT THE SAME TIME EVERY DAY Strength: 2.5-2.5 mcg/actuation  Dispense: 18 g; Refill: 3    2. Neuropathy    3. HTN (hypertension), benign  Overview:  lisinopril refilled      4. Chronic left shoulder pain    5. Herniation of cervical intervertebral disc       Future Appointments   Date Time Provider Department Center   3/23/2023  1:00 PM Merritt Escobedo MD [address]       Advised to eat foods with fiber and take miralax daily, he denies presence of hemorrhoids. It is difficult for him to get on the examination table due to his pain        Flu shot today

## 2023-03-01 DIAGNOSIS — G62.9 NEUROPATHY: Chronic | ICD-10-CM

## 2023-03-01 RX ORDER — GABAPENTIN 600 MG/1
600 TABLET ORAL 2 TIMES DAILY
Qty: 180 TABLET | Refills: 3 | Status: SHIPPED | OUTPATIENT
Start: 2023-03-01

## 2023-03-01 NOTE — TELEPHONE ENCOUNTER
----- Message from Caty Coronel sent at 3/1/2023  9:51 AM CST -----  Contact: self  Type:  RX Refill Request    Who Called: Hiren Bautista    Refill or New Rx:refill    RX Name and Strength:gabapentin (NEURONTIN) 600 MG tablet  How is the patient currently taking it? (ex. 1XDay):1 daily  Is this a 30 day or 90 day RX:60  Preferred Pharmacy with phone number:  [facility] [address] [place] [address] [facility] [address]  [address] [address] [address]  Phone: [phone] Fax: [phone]      Local or Mail Order:local  Ordering Provider:nadine wells  Would the patient rather a call back or a response via [facility]? N/a  Best Call Back Number:n/a  Additional Information: n/a

## 2023-10-22 DIAGNOSIS — K21.9 GASTROESOPHAGEAL REFLUX DISEASE: ICD-10-CM

## 2023-10-22 DIAGNOSIS — I10 HTN (HYPERTENSION), BENIGN: Chronic | ICD-10-CM

## 2023-10-24 RX ORDER — OMEPRAZOLE 40 MG/1
40 CAPSULE, DELAYED RELEASE ORAL EVERY MORNING
Qty: 30 CAPSULE | Refills: 0 | Status: SHIPPED | OUTPATIENT
Start: 2023-10-24

## 2023-10-24 RX ORDER — LISINOPRIL 10 MG/1
10 TABLET ORAL
Qty: 30 TABLET | Refills: 0 | Status: SHIPPED | OUTPATIENT
Start: 2023-10-24

## 2024-05-02 DIAGNOSIS — G62.9 NEUROPATHY: Chronic | ICD-10-CM

## 2024-05-02 RX ORDER — GABAPENTIN 600 MG/1
600 TABLET ORAL 2 TIMES DAILY
Qty: 60 TABLET | Refills: 0 | OUTPATIENT
Start: 2024-05-02